# Patient Record
Sex: FEMALE
[De-identification: names, ages, dates, MRNs, and addresses within clinical notes are randomized per-mention and may not be internally consistent; named-entity substitution may affect disease eponyms.]

---

## 2019-12-30 ENCOUNTER — HOSPITAL ENCOUNTER (INPATIENT)
Dept: HOSPITAL 41 - JD.OBCHECK | Age: 24
LOS: 2 days | Discharge: HOME | End: 2020-01-01
Attending: FAMILY MEDICINE | Admitting: FAMILY MEDICINE
Payer: COMMERCIAL

## 2019-12-30 DIAGNOSIS — D64.9: ICD-10-CM

## 2019-12-30 DIAGNOSIS — F32.9: ICD-10-CM

## 2019-12-30 DIAGNOSIS — Z3A.39: ICD-10-CM

## 2019-12-30 PROCEDURE — 3E0R3BZ INTRODUCTION OF ANESTHETIC AGENT INTO SPINAL CANAL, PERCUTANEOUS APPROACH: ICD-10-PCS | Performed by: FAMILY MEDICINE

## 2019-12-30 RX ADMIN — WITCH HAZEL PRN TUBE: 500 SOLUTION RECTAL; TOPICAL at 22:19

## 2019-12-30 NOTE — PCM.LDHP
L&D History of Present Illness





- General


Date of Service: 19


Admit Problem/Dx: 


 Patient Status Order with Admit Dx/Problem





19 10:49


Patient Status [ADT] Routine 





19 14:27


Patient Status [ADT] Routine 








 Admission Diagnosis/Problem











Admission Diagnosis/Problem    Term pregnancy














Source of Information: Patient


History Limitations: Reports: No Limitations





- History of Present Illness


Introduction:: 





25 yo primip at 39 +5 weeks gestation admitted after SROM at home at 0700 

today.  She had a large gush of clear fluid and then has continued to leak 

fluid since then.  She was scheduled for induction tonight.  She did notice a 

bit of bloody discharge as well after the gush.  She had not been having 

contractions and by noon when I went over to see her, she wasn't really having 

any contractions but was starting to feel some tightening.  THe unit has been 

really busy and have not yet started an IV to start pitocin induction on her.  

NST reactive on admission.  She is GBS negative.  Prenatal course has been 

unremarkable.  Her blood type is A positive, infection testing all negative.  

Pap was normal.  1 hour glucola was 135 so we did 3 hour GTT and all results 

were negatvie.  She does have a history of depression and has continued her 

regular dose of Prozac during her pregnancy and her mood has been stable.  She 

is on 10 mg of prozac.  She plans to breastfeed.  Baby is a girl on ultrasound.

  


Pain Score: 7





- Related Data


Allergies/Adverse Reactions: 


 Allergies











Allergy/AdvReac Type Severity Reaction Status Date / Time


 


doxycycline Allergy  Anaphylactic Verified 19 10:42





   Shock  











Home Medications: 


 Home Meds





FLUoxetine [PROzac] 10 mg PO DAILY 19 [History]


Pnv #30/Iron Carb&Aspg/Fa/Om3 [OB Complete with DHA Softgel] 1 each PO DAILY  [History]


Vit B6/Me-Thfolate/Me-B12/Ala [Nufola Capsule] 1 tab PO DAILY 19 [History]











Past Medical History


OB/GYN History: Reports: Pregnancy


Psychiatric History: Reports: Depression





Social & Family History





- Family History


Family Medical History: Noncontributory





- Tobacco Use


Smoking Status *Q: Never Smoker


Second Hand Smoke Exposure: No





- Caffeine Use


Caffeine Use: Reports: None





- Recreational Drug Use


Recreational Drug Use: No





H&P Review of Systems





- Review of Systems:


Review Of Systems: See Below


General: Reports: No Symptoms


HEENT: Reports: No Symptoms


Pulmonary: Reports: No Symptoms


Cardiovascular: Reports: No Symptoms


Gastrointestinal: Reports: No Symptoms


Genitourinary: Reports: No Symptoms


Musculoskeletal: Reports: No Symptoms


Skin: Reports: No Symptoms


Psychiatric: Reports: No Symptoms


Neurological: Reports: No Symptoms


Hematologic/Lymphatic: Reports: No Symptoms


Immunologic: Reports: No Symptoms





L&D Exam





- Exam


Exam: See Below





- Vital Signs


Vital Signs: 


 Last Vital Signs











Temp  36.7 C   19 10:47


 


Pulse  92   19 10:47


 


Resp  16   19 10:47


 


BP  118/73   19 10:47


 


Pulse Ox      











Weight: 86.636 kg





- OB Specific


Contraction Duration (sec): irregular tightenings


Contraction Intensity: Mild


Fetal Movement: Active


Fetal Heart Tones: Present


Fetal Heart Tones per Min: 135


Fetal Heart Rate (FHR) Variability: Moderate (6-25 bmp)


Birth Presentation: Vertex


Estimated Fetal Weight: 8 lbs





- Kent Score


Kent Score Cervix Position: Anterior


Kent Score Consistency: Soft


Kent Score Effacement: >80%


Kent Score Dilation: 3-4 cm


Kent Score Infant's Station: -1 ,0


Kent Score Total: 11





- Exam


General: Alert, Oriented


HEENT: EOMI, Mucosa Moist & Pink, Pupils Equal


Neck: Supple, Trachea Midline


Lungs: Normal Respiratory Effort


Cardiovascular: Regular Rate, Regular Rhythm


GI/Abdominal Exam: Soft, No Distention


Rectal Exam: Deferred


Genitourinary: Normal external exam, Cervical dilitation, Cervical fluid


Back Exam: Normal Inspection, Full Range of Motion


Extremities: Normal Inspection, Normal Range of Motion, No Pedal Edema


Skin: Warm, Dry, Intact


Psychiatric: Alert, Normal Affect, Normal Mood





- Patient Data


Lab Results Last 24 hrs: 


 Laboratory Results - last 24 hr











  19 Range/Units





  11:30 11:30 


 


WBC  10.53 H   (3.98-10.04)  K/mm3


 


RBC  4.14   (3.98-5.22)  M/mm3


 


Hgb  12.6   (11.2-15.7)  gm/dl


 


Hct  37.8   (34.1-44.9)  %


 


MCV  91.3   (79.4-94.8)  fl


 


MCH  30.4   (25.6-32.2)  pg


 


MCHC  33.3   (32.2-35.5)  g/dl


 


RDW Std Deviation  46.9 H   (36.4-46.3)  fL


 


Plt Count  202   (182-369)  K/mm3


 


MPV  10.2   (9.4-12.3)  fl


 


Neut % (Auto)  77.1 H   (34.0-71.1)  %


 


Lymph % (Auto)  14.3 L   (19.3-51.7)  %


 


Mono % (Auto)  7.0   (4.7-12.5)  %


 


Eos % (Auto)  0.5 L   (0.7-5.8)  


 


Baso % (Auto)  0.2   (0.1-1.2)  %


 


Neut # (Auto)  8.12 H   (1.56-6.13)  K/mm3


 


Lymph # (Auto)  1.51   (1.18-3.74)  K/mm3


 


Mono # (Auto)  0.74 H   (0.24-0.36)  K/mm3


 


Eos # (Auto)  0.05   (0.04-0.36)  K/mm3


 


Baso # (Auto)  0.02   (0.01-0.08)  K/mm3


 


Blood Type   A POSITIVE  


 


Gel Antibody Screen   Negative  











Result Diagrams: 


 19 11:30








- Problem List


(1) SROM (spontaneous rupture of membranes)


SNOMED Code(s): 821813996


   ICD Code: ZFZ8205 -    Status: Acute   Current Visit: Yes   





(2) 39 weeks gestation of pregnancy


SNOMED Code(s): 55819526


   ICD Code: Z3A.39 - 39 WEEKS GESTATION OF PREGNANCY   Status: Acute   Current 

Visit: Yes   


Problem List Initiated/Reviewed/Updated: Yes


Orders Last 24hrs: 


 Active Orders 24 hr











 Category Date Time Status


 


 Patient Status [ADT] Routine ADT  19 14:27 Active


 


 Activity as Tolerated [RC] PFP Care  19 10:47 Active


 


 Communication Order [RC] ASDIRECTED Care  19 10:47 Active


 


 Notify Provider [RC] ASDIRECTED Care  19 11:50 Active


 


 Notify Provider [RC] PFP Care  19 10:47 Active


 


 Notify Provider [RC] PRN Care  19 10:47 Active


 


 Peripheral IV Care [RC] Q2HR Care  19 10:48 Active


 


 Vital Signs [RC] PER UNIT ROUTINE Care  19 10:47 Active


 


 Regular Diet [DIET] Diet  19 Lunch Active


 


 RAPID PLASMA REAGIN,RPR [CHEM] Routine Lab  19 11:30 Received


 


 Bupivicaine/fentaNYL/NS [fentaNYL/Bupivacaine/NS 2 MCG- Med  19 11:50 

Active





 0.125% 250 ML]   





 250 ml EPIDUR CONTINUOUS PRN   


 


 Lactated Ringers [Ringers, Lactated] 1,000 ml Med  19 11:00 Active





 IV ASDIRECTED   


 


 Nalbuphine [Nubain] Med  19 10:47 Active





 10 mg IVPUSH Q2H PRN   


 


 Ondansetron [Zofran] Med  19 15:44 Active





 4 mg IVPUSH Q4H PRN   


 


 Oxytocin/Lactated Ringers [Pitocin in LR 10 Units/1,000 Med  19 11:00 

Active





 ML]   





 10 unit in 1,000 ml IV .CONTINUOUS   


 


 Oxytocin/Lactated Ringers [Pitocin in LR 10 Units/1,000 Med  19 11:00 

Active





 ML]   





 10 unit in 1,000 ml IV TITRATE   


 


 Sodium Chloride 0.9% [Saline Flush] Med  19 10:47 Active





 10 ml FLUSH ASDIRECTED PRN   


 


 diphenhydrAMINE [Benadryl] Med  19 11:50 Active





 25 mg IVPUSH Q6H PRN   


 


 ePHEDrine [ePHEDrine sulfate] Med  19 11:50 Active





 5 mg IVPUSH ASDIRECTED PRN   


 


 fentaNYL [Sublimaze] Med  19 11:50 Active





 100 mcg EPIDUR Q3H PRN   


 


 Electronic Fetal Heart Tones Ext w TOCO [WOMSER] Oth  19 10:47 Ordered





 Routine   


 


 Electronic Fetal Heart Tones Internal [WOMSER] Per Unit Oth  19 10:47 

Ordered





 Routine   


 


 Peripheral IV Insertion Adult [OM.PC] Routine Oth  19 10:47 Ordered


 


 Resuscitation Status Routine Resus Stat  19 10:47 Ordered








 Medication Orders





Diphenhydramine HCl (Benadryl)  25 mg IVPUSH Q6H PRN


   PRN Reason: pruritis


Ephedrine Sulfate (Ephedrine Sulfate)  5 mg IVPUSH ASDIRECTED PRN


   PRN Reason: Hypotension


Fentanyl (Sublimaze)  100 mcg EPIDUR Q3H PRN


   PRN Reason: Pain


   Last Admin: 19 15:29  Dose: 100 mcg


Fentanyl/Bupivacaine HCl (Fentanyl/Bupivacaine/Ns 2 Mcg-0.125% 250 Ml)  250 ml 

EPIDUR CONTINUOUS PRN


   PRN Reason: Pain


   Last Admin: 19 15:30  Dose: 250 ml


Lactated Ringer's (Ringers, Lactated)  1,000 mls @ 100 mls/hr IV ASDIRECTED FABBY


   Last Admin: 19 17:04  Dose: 100 mls/hr


   Infusion: 19 17:04  Dose: 999 mls/hr


   Admin: 19 16:03  Dose: 999 mls/hr


   Infusion: 19 16:03  Dose: 100 mls/hr


   Admin: 19 13:11  Dose: 100 mls/hr


Oxytocin/Lactated Ringer's (Pitocin In Lr 10 Units/1,000 Ml)  10 unit in 1,000 

mls @ 12 mls/hr IV TITRATE FABBY; Protocol


   Last Titration: 19 17:00  Dose: 14 munits/min, 84 mls/hr


   Titration: 19 16:30  Dose: 12 munits/min, 72 mls/hr


   Titration: 19 15:56  Dose: 10 munits/min, 60 mls/hr


   Titration: 19 14:59  Dose: 8 munits/min, 48 mls/hr


   Titration: 19 14:30  Dose: 6 munits/min, 36 mls/hr


   Titration: 19 13:58  Dose: 4 munits/min, 24 mls/hr


   Admin: 19 13:12  Dose: 2 munits/min, 12 mls/hr


Oxytocin/Lactated Ringer's (Pitocin In Lr 10 Units/1,000 Ml)  10 unit in 1,000 

mls @ 100 mls/hr IV .CONTINUOUS FABBY; Protocol


Nalbuphine HCl (Nubain)  10 mg IVPUSH Q2H PRN


   PRN Reason: Pain


Ondansetron HCl (Zofran)  4 mg IVPUSH Q4H PRN


   PRN Reason: Nausea


   Last Admin: 19 15:40  Dose: 4 mg


Sodium Chloride (Saline Flush)  10 ml FLUSH ASDIRECTED PRN


   PRN Reason: Keep Vein Open








Assessment/Plan Comment:: 





24 year old  at 39+5 weeks gestation with SROM and favorable cervix.  Not 

having contractions, so will start pitocin to augment.  SROM was at 0700 and at 

12:30 pm, still waiting for nursing staff to be able to start the pitocin.  

Reactive NST.





Plan:  Start pitocin augmentation


         Expectant management of labor, plan for vaginal delivery.


         Epidural when appropriate.


         Plans to breastfeed.

## 2019-12-30 NOTE — PCM.DEL
L & D Note





- General Info


Date of Service: 19


Mother's Due Date: 20





- Delivery Note


Labor: Spontaneous, Augmented by Oxytocin


Delivery Outcome: Livebirth


Infant Delivery Method: Spontaneous Vaginal Delivery-Single


Infant Delivery Mode: Spontaneous


Birth Presentation: Right Occiput Anterior (BELKYS)


Nuchal Cord: None


Prep: Povidone-Iodine (Betadine


Anesthesia Type: Epidural


Amniotic Fluid Description: Clear


Episiotomy Type: Right Mediolateral


Laceration: Periurethral


Suture type: Vicryl


Suture size: 3-0


Placenta: Intact, Spontaneous


Cord: 3 Vessels


Estimated Blood Loss: 600


Resuscitation Needed: No


[place]: Suctioned (DeLee suction for 21 ml of clear fluid), Stimulated, Warmed

, [place] Used, Warmer Used


 Provider: Gilma Liu


APGAR Score 1 min: 8


APGAR Score 5 min: 9


Delivery Comments (Free Text/Narrative):: 





25 yo A1 at 39 +5 weeks gestation admitted after SROM at home at 0700 

today.  She had a large gush of clear fluid and then has continued to leak 

fluid since then.  She was scheduled for induction tonight.  She did notice a 

bit of bloody discharge as well after the gush.  She had not been having 

contractions and by noon when I went over to see her, she wasn't really having 

any contractions but was starting to feel some tightening.  THe unit had been 

really busy and Pitocin induction was started at 1310.  NST reactive on 

admission.  She is GBS negative.  Prenatal course has been unremarkable.  Her 

blood type is A positive, infection testing all negative.  Pap was normal.  1 

hour glucola was 135 so we did 3 hour GTT and all results were negatvie.  She 

does have a history of depression and has continued her regular dose of Prozac 

during her pregnancy and her mood has been stable.  She is on 10 mg of prozac.  

She plans to breastfeed.  Baby is a girl on ultrasound. 





She did start having contractions with the pitocin and was increased to a max 

of 14 mU/min.  She requested an epidural which was placed at about 1545.  She 

had good relief with the epidural.  She was 6 cm dilated by 1630 and then 

completely dilated at 1830.  We had her start pushing and she did well.  Baby 

tolerated second stage of labor.  FHR between pushes was  and then up to 

130-150 during pushes.  The perineum was tight around baby's head.  Lidocaine 

was locally infiltrated into the perineum and then a RML episiotomy was 

performed with a push and baby's head delivered with that set of pushes.  Baby 

was BELKYS.  THere was no nuchal cord.  THe shoulders and the rest of the baby 

delivered without difficulty. Time of delivery was  and it is a baby girl. 

  Mouth and nose were suctioned and then she was placed on mother's abdomen for 

further drying and stimulation.  Once the cord stopped pulsating, it was 

clamped and cut.  Baby was placed skin to skin.  Placenta delivered 

spontaneously at  and was intact, 3 vessels in the cord.  Pitocin IV bolus 

was started and bimanual massage was performed.  There was brisk flow of blood 

from the episiotomy site.  There was no extension to the episiotomy.  3-0 

vicryl was used to repair in the usual manner.  I had to place several deep 

sutures prior to completing the episiotomy repair.  EBL was 600 ml.  BP 

remained stable and patient was left in the delivery room with baby in stable 

condition.  Fundus was firm after repair complete and some clots were expressed 

from the uterus.  





- General Info


Date of Service: 19


Admission Dx/Problem (Free Text): 


 Patient Status Order with Admit Dx/Problem





19 10:49


Patient Status [ADT] Routine 





19 14:27


Patient Status [ADT] Routine 








 Admission Diagnosis/Problem











Admission Diagnosis/Problem    Term pregnancy














Functional Status: Reports: Pain Controlled, Tolerating Diet





- Review of Systems


General: Reports: No Symptoms


HEENT: Reports: No Symptoms


Pulmonary: Reports: No Symptoms


Cardiovascular: Reports: No Symptoms


Gastrointestinal: Reports: No Symptoms


Genitourinary: Reports: No Symptoms


Musculoskeletal: Reports: No Symptoms


Skin: Reports: No Symptoms


Neurological: Reports: No Symptoms


Psychiatric: Reports: No Symptoms





- Patient Data


Vitals - Most Recent: 


 Last Vital Signs











Temp  36.7 C   19 10:47


 


Pulse  92   19 10:47


 


Resp  16   19 10:47


 


BP  118/73   19 10:47


 


Pulse Ox      











Weight - Most Recent: 86.636 kg


Lab Results Last 24 Hours: 


 Laboratory Results - last 24 hr











  19 Range/Units





  11:30 11:30 11:30 


 


WBC   10.53 H   (3.98-10.04)  K/mm3


 


RBC   4.14   (3.98-5.22)  M/mm3


 


Hgb   12.6   (11.2-15.7)  gm/dl


 


Hct   37.8   (34.1-44.9)  %


 


MCV   91.3   (79.4-94.8)  fl


 


MCH   30.4   (25.6-32.2)  pg


 


MCHC   33.3   (32.2-35.5)  g/dl


 


RDW Std Deviation   46.9 H   (36.4-46.3)  fL


 


Plt Count   202   (182-369)  K/mm3


 


MPV   10.2   (9.4-12.3)  fl


 


Neut % (Auto)   77.1 H   (34.0-71.1)  %


 


Lymph % (Auto)   14.3 L   (19.3-51.7)  %


 


Mono % (Auto)   7.0   (4.7-12.5)  %


 


Eos % (Auto)   0.5 L   (0.7-5.8)  


 


Baso % (Auto)   0.2   (0.1-1.2)  %


 


Neut # (Auto)   8.12 H   (1.56-6.13)  K/mm3


 


Lymph # (Auto)   1.51   (1.18-3.74)  K/mm3


 


Mono # (Auto)   0.74 H   (0.24-0.36)  K/mm3


 


Eos # (Auto)   0.05   (0.04-0.36)  K/mm3


 


Baso # (Auto)   0.02   (0.01-0.08)  K/mm3


 


RPR  Non-reactive    (NONREACTIVE)  


 


Blood Type    A POSITIVE  


 


Gel Antibody Screen    Negative  











Med Orders - Current: 


 Current Medications





Diphenhydramine HCl (Benadryl)  25 mg IVPUSH Q6H PRN


   PRN Reason: pruritis


Ephedrine Sulfate (Ephedrine Sulfate)  5 mg IVPUSH ASDIRECTED PRN


   PRN Reason: Hypotension


Fentanyl (Sublimaze)  100 mcg EPIDUR Q3H PRN


   PRN Reason: Pain


   Last Admin: 19 15:29 Dose:  100 mcg


Fentanyl/Bupivacaine HCl (Fentanyl/Bupivacaine/Ns 2 Mcg-0.125% 250 Ml)  250 ml 

EPIDUR CONTINUOUS PRN


   PRN Reason: Pain


   Last Admin: 19 15:30 Dose:  250 ml


Lactated Ringer's (Ringers, Lactated)  1,000 mls @ 100 mls/hr IV ASDIRECTED FABBY


   Last Admin: 19 17:04 Dose:  100 mls/hr


Oxytocin/Lactated Ringer's (Pitocin In Lr 10 Units/1,000 Ml)  10 unit in 1,000 

mls @ 12 mls/hr IV TITRATE FABBY; Protocol


   Last Titration: 19 17:00 Dose:  14 munits/min, 84 mls/hr


Oxytocin/Lactated Ringer's (Pitocin In Lr 10 Units/1,000 Ml)  10 unit in 1,000 

mls @ 100 mls/hr IV .CONTINUOUS FABBY; Protocol


Nalbuphine HCl (Nubain)  10 mg IVPUSH Q2H PRN


   PRN Reason: Pain


Ondansetron HCl (Zofran)  4 mg IVPUSH Q4H PRN


   PRN Reason: Nausea


   Last Admin: 19 15:40 Dose:  4 mg


Sodium Chloride (Saline Flush)  10 ml FLUSH ASDIRECTED PRN


   PRN Reason: Keep Vein Open





Discontinued Medications





Lidocaine HCl (Xylocaine 1%) Confirm Administered Dose 50 ml .ROUTE .[facility]


   Stop: 19 20:12


Nalbuphine HCl (Nubain)  10 mg IVPUSH Q2H PRN


   PRN Reason: Pain











- Exam


General: Alert, Oriented


HEENT: Pupils Equal, Mucous Membr. Moist/Pink


Neck: Supple


Lungs: Normal Respiratory Effort


Cardiovascular: Regular Rate, Regular Rhythm


GI/Abdominal Exam: Normal Bowel Sounds, Soft


 (Female) Exam: Fundal Height (firm at umbilicus), Vaginal Bleeding


Back Exam: Normal Inspection, Full Range of Motion


Extremities: Normal Inspection, Normal Range of Motion


Neurological: No New Focal Deficit


Psy/Mental Status: Alert, Normal Affect, Normal Mood





- Problem List & Annotations


(1) SROM (spontaneous rupture of membranes)


SNOMED Code(s): 742978737


   Code(s): FIH2535 -    Status: Acute   Current Visit: Yes   





(2) 39 weeks gestation of pregnancy


SNOMED Code(s): 40097302


   Code(s): Z3A.39 - 39 WEEKS GESTATION OF PREGNANCY   Status: Acute   Current 

Visit: Yes   





(3) Normal spontaneous vaginal delivery


SNOMED Code(s): 31838761, 171257691


   Code(s): O80 - ENCOUNTER FOR FULL-TERM UNCOMPLICATED DELIVERY   Status: 

Acute   Current Visit: Yes   





(4) Mother currently breast-feeding


SNOMED Code(s): 526871482


   Code(s): Z39.1 - ENCOUNTER FOR CARE AND EXAMINATION OF LACTATING MOTHER   

Status: Acute   Current Visit: Yes   





(5) Depression affecting pregnancy


SNOMED Code(s): 52823098823762


   Code(s): O99.340 - [facility] MENTAL DISORDERS COMPLICATING PREGNANCY, UNSP 

TRIMESTER; F32.9 - MAJOR DEPRESSIVE DISORDER, SINGLE EPISODE, UNSPECIFIED   

Status: Acute   Current Visit: Yes   





- Problem List Review


Problem List Initiated/Reviewed/Updated: Yes





- My Orders


Last 24 Hours: 


My Active Orders





19 10:47


Activity as Tolerated [RC] PFP 


Communication Order [RC] ASDIRECTED 


Notify Provider [RC] PFP 


Notify Provider [RC] PRN 


Vital Signs [RC] PER UNIT ROUTINE 


Nalbuphine [Nubain]   10 mg IVPUSH Q2H PRN 


Sodium Chloride 0.9% [Saline Flush]   10 ml FLUSH ASDIRECTED PRN 


Electronic Fetal Heart Tones Ext w TOCO [WOMSER] Routine 


Electronic Fetal Heart Tones Internal [WOMSER] Per Unit Routine 


Peripheral IV Insertion Adult [OM.PC] Routine 


Resuscitation Status Routine 





19 10:48


Peripheral IV Care [RC] Q2HR 





19 11:00


Lactated Ringers [Ringers, Lactated] 1,000 ml IV ASDIRECTED 


Oxytocin/Lactated Ringers [Pitocin in LR 10 Units/1,000 ML] 10 unit in 1,000 ml 

IV .CONTINUOUS 


Oxytocin/Lactated Ringers [Pitocin in LR 10 Units/1,000 ML] 10 unit in 1,000 ml 

IV TITRATE 





19 14:27


Patient Status [ADT] Routine 





19 15:44


Ondansetron [Zofran]   4 mg IVPUSH Q4H PRN 





19 Lunch


Regular Diet [DIET] 














- Assessment


Assessment:: 





 of a LGA infant weighing 9 lb 2 oz, 4150 grams.  RML episiotomy performed 

and deep sutures required for repair.  Mom plans to breastfeed.  





- Plan


Plan:: 





24 year old  at 39+5 weeks gestation with SROM and favorable cervix.  Not 

having contractions, so will start pitocin to augment.  SROM was at 0700 and at 

12:30 pm, still waiting for nursing staff to be able to start the pitocin.  

Reactive NST.





Plan:  Start pitocin augmentation


         Expectant management of labor, plan for vaginal delivery.


         Epidural when appropriate.


         Plans to breastfeed.  





190:  Routine postpartum care.   ml, monitor for further 

bleeding and with episiotomy, watch for perineal hematoma.  


                       Breastfeeding support and education.

## 2019-12-30 NOTE — PCM.PREANE
Preanesthetic Assessment





- Anesthesia/Transfusion/Family Hx


Anesthesia History: Prior Anesthesia Without Reaction


Family History of Anesthesia Reaction: No


Transfusion History: No Prior Transfusion(s)





- Review of Systems


General: No Symptoms


Pulmonary: No Symptoms


Cardiovascular: No Symptoms


Gastrointestinal: No Symptoms


Neurological: Other (Occasional back pain with nerve pain relieved with 

stretching legs. )


Other: Reports: None





- Physical Assessment


Vital Signs: 





 Last Vital Signs











Temp  36.7 C   12/30/19 10:47


 


Pulse  92   12/30/19 10:47


 


Resp  16   12/30/19 10:47


 


BP  118/73   12/30/19 10:47


 


Pulse Ox      











Height: 1.63 m


Weight: 86.636 kg


ASA Class: 2


Mental Status: Alert & Oriented x3


Airway Class: Mallampati = 2


Dentition: Reports: Normal Dentition


Thyro-Mental Finger Breadths: 3


Mouth Opening Finger Breadths: 3


ROM/Head Extension: Full


Lungs: Clear to Auscultation, Normal Respiratory Effort


Cardiovascular: Regular Rate, Regular Rhythm





- Lab


Values: 





 Laboratory Last Values











WBC  10.53 K/mm3 (3.98-10.04)  H  12/30/19  11:30    


 


RBC  4.14 M/mm3 (3.98-5.22)   12/30/19  11:30    


 


Hgb  12.6 gm/dl (11.2-15.7)   12/30/19  11:30    


 


Hct  37.8 % (34.1-44.9)   12/30/19  11:30    


 


MCV  91.3 fl (79.4-94.8)   12/30/19  11:30    


 


MCH  30.4 pg (25.6-32.2)   12/30/19  11:30    


 


MCHC  33.3 g/dl (32.2-35.5)   12/30/19  11:30    


 


RDW Std Deviation  46.9 fL (36.4-46.3)  H  12/30/19  11:30    


 


Plt Count  202 K/mm3 (182-369)   12/30/19  11:30    


 


MPV  10.2 fl (9.4-12.3)   12/30/19  11:30    


 


Neut % (Auto)  77.1 % (34.0-71.1)  H  12/30/19  11:30    


 


Lymph % (Auto)  14.3 % (19.3-51.7)  L  12/30/19  11:30    


 


Mono % (Auto)  7.0 % (4.7-12.5)   12/30/19  11:30    


 


Eos % (Auto)  0.5  (0.7-5.8)  L  12/30/19  11:30    


 


Baso % (Auto)  0.2 % (0.1-1.2)   12/30/19  11:30    


 


Neut # (Auto)  8.12 K/mm3 (1.56-6.13)  H  12/30/19  11:30    


 


Lymph # (Auto)  1.51 K/mm3 (1.18-3.74)   12/30/19  11:30    


 


Mono # (Auto)  0.74 K/mm3 (0.24-0.36)  H  12/30/19  11:30    


 


Eos # (Auto)  0.05 K/mm3 (0.04-0.36)   12/30/19  11:30    


 


Baso # (Auto)  0.02 K/mm3 (0.01-0.08)   12/30/19  11:30    


 


Blood Type  A POSITIVE   12/30/19  11:30    


 


Gel Antibody Screen  Negative   12/30/19  11:30    














- Allergies


Allergies/Adverse Reactions: 


 Allergies











Allergy/AdvReac Type Severity Reaction Status Date / Time


 


doxycycline Allergy  Anaphylactic Verified 12/30/19 10:42





   Shock  














- Acknowledgements


Anesthesia Type Planned: Epidural


Pt an Appropriate Candidate for the Planned Anesthesia: Yes


Alternatives and Risks of Anesthesia Discussed w Pt/Guardian: Yes


Pt/Guardian Understands and Agrees with Anesthesia Plan: Yes





PreAnesthesia Questionnaire


OB/GYN History: Reports: Pregnancy


Psychiatric History: Reports: Depression





- SUBSTANCE USE


Smoking Status *Q: Never Smoker


Second Hand Smoke Exposure: No


Recreational Drug Use History: No





- HOME MEDS


Home Medications: 


 Home Meds





FLUoxetine [PROzac] 10 mg PO DAILY 12/30/19 [History]


Pnv #30/Iron Carb&Aspg/Fa/Om3 [OB Complete with DHA Softgel] 1 each PO DAILY 12/ 30/19 [History]


Vit B6/Me-Thfolate/Me-B12/Ala [Nufola Capsule] 1 tab PO DAILY 12/30/19 [History]











- CURRENT (IN HOUSE) MEDS


Current Meds: 





 Current Medications





Diphenhydramine HCl (Benadryl)  25 mg IVPUSH Q6H PRN


   PRN Reason: pruritis


Ephedrine Sulfate (Ephedrine Sulfate)  5 mg IVPUSH ASDIRECTED PRN


   PRN Reason: Hypotension


Fentanyl (Sublimaze)  100 mcg EPIDUR Q3H PRN


   PRN Reason: Pain


Fentanyl/Bupivacaine HCl (Fentanyl/Bupivacaine/Ns 2 Mcg-0.125% 250 Ml)  250 ml 

EPIDUR CONTINUOUS PRN


   PRN Reason: Pain


Lactated Ringer's (Ringers, Lactated)  1,000 mls @ 100 mls/hr IV ASDIRECTED FABBY


   Last Admin: 12/30/19 13:11 Dose:  100 mls/hr


Oxytocin/Lactated Ringer's (Pitocin In Lr 10 Units/1,000 Ml)  10 unit in 1,000 

mls @ 12 mls/hr IV TITRATE FABBY; Protocol


   Last Titration: 12/30/19 14:30 Dose:  6 munits/min, 36 mls/hr


Oxytocin/Lactated Ringer's (Pitocin In Lr 10 Units/1,000 Ml)  10 unit in 1,000 

mls @ 100 mls/hr IV .CONTINUOUS FABBY; Protocol


Nalbuphine HCl (Nubain)  10 mg IVPUSH Q2H PRN


   PRN Reason: Pain


Sodium Chloride (Saline Flush)  10 ml FLUSH ASDIRECTED PRN


   PRN Reason: Keep Vein Open





Discontinued Medications





Nalbuphine HCl (Nubain)  10 mg IVPUSH Q2H PRN


   PRN Reason: Pain

## 2019-12-31 RX ADMIN — VITAMIN A, ASCORBIC ACID, CHOLECALCIFEROL, .ALPHA.-TOCOPHEROL ACETATE, DL-, THIAMINE MONONITRATE, RIBOFLAVIN, NIACINAMIDE, PYRIDOXINE HYDROCHLORIDE, FOLIC ACID, CYANOCOBALAMIN, CALCIUM CARBONATE, IRON, ZINC OXIDE, AND CUPRIC OXIDE SCH EACH: 4000; 120; 400; 22; 1.84; 3; 20; 10; 1; 12; 200; 29; 25; 2 TABLET ORAL at 08:40

## 2019-12-31 RX ADMIN — WITCH HAZEL PRN TUBE: 500 SOLUTION RECTAL; TOPICAL at 22:27

## 2019-12-31 RX ADMIN — FERROUS SULFATE TAB EC 324 MG (65 MG FE EQUIVALENT) SCH MG: 324 (65 FE) TABLET DELAYED RESPONSE at 08:40

## 2019-12-31 NOTE — [id number]
Post Anesthesia Note





- EVALUATION WITHIN 48HRS OF ANESTHETIC


Vital Signs in Normal Range: Yes


Patient Participated in Evaluation: Yes


Respiratory Function Stable: Yes


Airway Patent: Yes


Cardiovascular Function Stable: Yes


Hydration Status Stable: Yes


Pain Control Satisfactory: Yes


Nausea and Vomiting Control Satisfactory: Yes


Mental Status Recovered: Yes


Vital Signs: 


 Last Vital Signs











Temp  36.7 C   12/31/19 03:10


 


Pulse  92   12/31/19 03:10


 


Resp  14   12/31/19 03:10


 


BP  103/70   12/31/19 03:10


 


Pulse Ox  99   12/31/19 03:10

## 2019-12-31 NOTE — PCM.PNPP
- General Info


Date of Service: 19


Admission Dx/Problem (Free Text): 


 Patient Status Order with Admit Dx/Problem





19 10:49


Patient Status [ADT] Routine 





19 14:27


Patient Status [ADT] Routine 








 Admission Diagnosis/Problem











Admission Diagnosis/Problem    Term pregnancy














Functional Status: Reports: Pain Controlled (Has only used ibuprofen for 

analgesia), Tolerating Diet, Ambulating, Urinating





- Review of Systems


General: Reports: No Symptoms


HEENT: Reports: No Symptoms


Pulmonary: Reports: No Symptoms


Cardiovascular: Reports: Edema


Gastrointestinal: Reports: No Symptoms


Genitourinary: Reports: No Symptoms


Musculoskeletal: Reports: Back Pain (Low back uncomfortable.  Mild tenderness 

over epidural site)


Skin: Reports: No Symptoms


Neurological: Reports: No Symptoms


Psychiatric: Reports: No Symptoms





- General Info


Date of Service: 19





- Patient Data


Vital Signs - Most Recent: 


 Last Vital Signs











Temp  36.7 C   19 03:10


 


Pulse  92   19 03:10


 


Resp  14   19 03:10


 


BP  103/70   19 03:10


 


Pulse Ox  99   19 03:10











Weight - Most Recent: 86.636 kg


Lab Results - Last 24 Hours: 


 Laboratory Results - last 24 hr











  19 Range/Units





  11:30 11:30 11:30 


 


WBC   10.53 H   (3.98-10.04)  K/mm3


 


RBC   4.14   (3.98-5.22)  M/mm3


 


Hgb   12.6   (11.2-15.7)  gm/dl


 


Hct   37.8   (34.1-44.9)  %


 


MCV   91.3   (79.4-94.8)  fl


 


MCH   30.4   (25.6-32.2)  pg


 


MCHC   33.3   (32.2-35.5)  g/dl


 


RDW Std Deviation   46.9 H   (36.4-46.3)  fL


 


Plt Count   202   (182-369)  K/mm3


 


MPV   10.2   (9.4-12.3)  fl


 


Neut % (Auto)   77.1 H   (34.0-71.1)  %


 


Lymph % (Auto)   14.3 L   (19.3-51.7)  %


 


Mono % (Auto)   7.0   (4.7-12.5)  %


 


Eos % (Auto)   0.5 L   (0.7-5.8)  


 


Baso % (Auto)   0.2   (0.1-1.2)  %


 


Neut # (Auto)   8.12 H   (1.56-6.13)  K/mm3


 


Lymph # (Auto)   1.51   (1.18-3.74)  K/mm3


 


Mono # (Auto)   0.74 H   (0.24-0.36)  K/mm3


 


Eos # (Auto)   0.05   (0.04-0.36)  K/mm3


 


Baso # (Auto)   0.02   (0.01-0.08)  K/mm3


 


RPR  Non-reactive    (NONREACTIVE)  


 


Blood Type    A POSITIVE  


 


Gel Antibody Screen    Negative  














  19 Range/Units





  06:20 


 


WBC  14.96 H  (3.98-10.04)  K/mm3


 


RBC  3.12 L  (3.98-5.22)  M/mm3


 


Hgb  9.3 L D  (11.2-15.7)  gm/dl


 


Hct  28.6 L  (34.1-44.9)  %


 


MCV  91.7  (79.4-94.8)  fl


 


MCH  29.8  (25.6-32.2)  pg


 


MCHC  32.5  (32.2-35.5)  g/dl


 


RDW Std Deviation  45.6  (36.4-46.3)  fL


 


Plt Count  206  (182-369)  K/mm3


 


MPV  10.6  (9.4-12.3)  fl


 


Neut % (Auto)   (34.0-71.1)  %


 


Lymph % (Auto)   (19.3-51.7)  %


 


Mono % (Auto)   (4.7-12.5)  %


 


Eos % (Auto)   (0.7-5.8)  


 


Baso % (Auto)   (0.1-1.2)  %


 


Neut # (Auto)   (1.56-6.13)  K/mm3


 


Lymph # (Auto)   (1.18-3.74)  K/mm3


 


Mono # (Auto)   (0.24-0.36)  K/mm3


 


Eos # (Auto)   (0.04-0.36)  K/mm3


 


Baso # (Auto)   (0.01-0.08)  K/mm3


 


RPR   (NONREACTIVE)  


 


Blood Type   


 


Gel Antibody Screen   











Med Orders - Current: 


 Current Medications





Acetaminophen (Tylenol)  650 mg PO Q4H PRN


   PRN Reason: mild pain or fever


Benzocaine/Menthol (Dermoplast Pain Relief Spray)  0 gm TOP ASDIRECTED PRN


   PRN Reason: Perineal Comfort Measure


   Last Admin: 19 22:19 Dose:  1 canister


Docusate Sodium (Colace)  100 mg PO BID FABBY


   Last Admin: 19 22:21 Dose:  100 mg


Ferrous Sulfate (Ferrous Sulfate)  324 mg PO WITHBREAKFAST FABBY


Fluoxetine HCl (Prozac)  10 mg PO DAILY [facility]


Hydrocortisone Acetate (Anucort-Hc)  25 mg RECTAL BID PRN


   PRN Reason: Hemorrhoid pain


Ibuprofen (Motrin)  800 mg PO Q6H PRN


   PRN Reason: Mild pain or fever


   Last Admin: 19 22:20 Dose:  800 mg


Prenat Multivit/[place]/Iron/Folic Ac (Prenatal Plus Iron)  1 each PO DAILY [facility]


Simethicone (Simethicone)  80 mg PO Q4H PRN


   PRN Reason: Gas


Witch Hazel (Tucks)  1 pad TOP ASDIRECTED PRN


   PRN Reason: Perineal Comfort Measure


   Last Admin: 19 22:19 Dose:  1 tube





Discontinued Medications





Diphenhydramine HCl (Benadryl)  25 mg IVPUSH Q6H PRN


   PRN Reason: pruritis


Ephedrine Sulfate (Ephedrine Sulfate)  5 mg IVPUSH ASDIRECTED PRN


   PRN Reason: Hypotension


Fentanyl (Sublimaze)  100 mcg EPIDUR Q3H PRN


   PRN Reason: Pain


   Last Admin: 19 15:29 Dose:  100 mcg


Fentanyl/Bupivacaine HCl (Fentanyl/Bupivacaine/Ns 2 Mcg-0.125% 250 Ml)  250 ml 

EPIDUR CONTINUOUS PRN


   PRN Reason: Pain


   Last Admin: 19 15:30 Dose:  250 ml


Lactated Ringer's (Ringers, Lactated)  1,000 mls @ 100 mls/hr IV ASDIRECTED FABBY


   Last Admin: 19 17:04 Dose:  100 mls/hr


Oxytocin/Lactated Ringer's (Pitocin In Lr 10 Units/1,000 Ml)  10 unit in 1,000 

mls @ 12 mls/hr IV TITRATE FABBY; Protocol


   Last Titration: 19 20:16 Dose:  500 mls/hr


Oxytocin/Lactated Ringer's (Pitocin In Lr 10 Units/1,000 Ml)  10 unit in 1,000 

mls @ 100 mls/hr IV .CONTINUOUS FABBY; Protocol


   Last Admin: 19 21:35 Dose:  250 mls/hr


Lidocaine HCl (Xylocaine 1%) Confirm Administered Dose 50 ml .ROUTE .ST[place]-MED ONE


   Stop: 19 20:12


   Last Admin: 19 20:10 Dose:  50 ml


Nalbuphine HCl (Nubain)  10 mg IVPUSH Q2H PRN


   PRN Reason: Pain


Nalbuphine HCl (Nubain)  10 mg IVPUSH Q2H PRN


   PRN Reason: Pain


Ondansetron HCl (Zofran)  4 mg IVPUSH Q4H PRN


   PRN Reason: Nausea


   Last Admin: 19 15:40 Dose:  4 mg


Sodium Chloride (Saline Flush)  10 ml FLUSH ASDIRECTED PRN


   PRN Reason: Keep Vein Open











- Infant Interaction


Infant Disposition, Postpartum:  at Bedside


Infant Interaction: Holding Infant


Infant Feeding: Attempted Breastfeeding; Nursed Fair/Poor (Doing better with 

latch and sucking with audible swallow this am.  )


Other Infant Feeding: expressed colostrum and formula syringe fed due to sugar 

39


Support Person: 





- Postpartum Recovery Exam


Fundal Tone: Firm


Fundal Level: 2 Fingerbreadths Below Umbilicus


Fundal Placement: Midline


Lochia Amount: Small


Lochia Color: Rubra/Red


Perineum Description: Ecchymotic, Edematous


Episiotomy/Laceration: Approximated


Bladder Status: Voiding





- Exam


General: Alert, Oriented, No Acute Distress


HEENT: Mucous Membr. Moist/Pink


Neck: Supple


Lungs: Normal Respiratory Effort


Cardiovascular: Regular Rate, Regular Rhythm


GI/Abdominal Exam: Normal Bowel Sounds, Soft, No Distention


Extremities: Pedal Edema (in feet mostly)


Skin: Warm, Dry, Intact


Wound/Incisions: Healing Well


Neurological: No New Focal Deficit


Psy/Mental Status: Alert, Normal Affect, Normal Mood





- Problem List & Annotations


(1) SROM (spontaneous rupture of membranes)


SNOMED Code(s): 045813507


   Code(s): MCI2773 -    Status: Acute   Current Visit: Yes   





(2) 39 weeks gestation of pregnancy


SNOMED Code(s): 03813085


   Code(s): Z3A.39 - 39 WEEKS GESTATION OF PREGNANCY   Status: Acute   Current 

Visit: Yes   





(3) Normal spontaneous vaginal delivery


SNOMED Code(s): 20125667, 762518033


   Code(s): O80 - ENCOUNTER FOR FULL-TERM UNCOMPLICATED DELIVERY   Status: 

Acute   Current Visit: Yes   





(4) Mother currently breast-feeding


SNOMED Code(s): 019416671


   Code(s): Z39.1 - ENCOUNTER FOR CARE AND EXAMINATION OF LACTATING MOTHER   

Status: Acute   Current Visit: Yes   





(5) Depression affecting pregnancy


SNOMED Code(s): 29586536521855


   Code(s): O99.340 - OTH MENTAL DISORDERS COMPLICATING PREGNANCY, UNSP 

TRIMESTER; F32.9 - MAJOR DEPRESSIVE DISORDER, SINGLE EPISODE, UNSPECIFIED   

Status: Acute   Current Visit: Yes   





- Problem List Review


Problem List Initiated/Reviewed/Updated: Yes





- My Orders


Last 24 Hours: 


My Active Orders





19 10:47


Resuscitation Status Routine 





19 22:02


Patient Status [ADT] Routine 


Activity as Tolerated [RC] PER UNIT ROUTINE 


May Shower [RC] ASDIRECTED 


Up ad [place] [RC] ASDIRECTED 


Vital Signs [RC] 21,03,09,15 


Acetaminophen [Tylenol]   650 mg PO Q4H PRN 


Benzocaine/Menthol [Dermoplast Pain Relief Spray]   See Dose Instructions  TOP 

ASDIRECTED PRN 


Docusate Sodium [Colace]   100 mg PO BID 


Hydrocortisone Acetate [Anucort-HC]   25 mg RECTAL BID PRN 


Ibuprofen [Motrin]   800 mg PO Q6H PRN 


Simethicone   80 mg PO Q4H PRN 


Witch Hazel [Tucks]   1 pad TOP ASDIRECTED PRN 


Assess Lochia [WOMSER] Per Unit Routine 


Assess Uterine Involution [WOMSER] Per Unit Routine 


Breast Pump [WOMSER] Per Unit Routine 


Heat Therapy [OM.PC] PRN 


Perineal Care [OM.PC] Per Unit Routine 


Peripheral IV Discontinue [OM.PC] Routine 


Sitz Bath [OM.PC] Per Unit Routine 





19 Lunch


Regular Diet [DIET] 





19 07:00


Ferrous Sulfate   324 mg PO WITHBREAKFAST 





19 09:00


FLUoxetine [PROzac]   10 mg PO DAILY 


Prenatal Vit with Ca/FA/Iron [Prenatal Plus Iron]   1 each PO DAILY 





19 22:02


Heat Therapy [OM.PC] PRN 














- Assessment


Assessment:: 





 of a LGA infant weighing 9 lb 2 oz, 4150 grams.  RML episiotomy performed 

and deep sutures required for repair.  Mom plans to breastfeed. 





19:  Normal postpartum course.  Vaginal bleeding is light to moderate, 

she denies passing clots.  Episiotomy is well approximated.  Hgb this am is 

down to 9.3, but vital stable and patient is asymptomatic.  Breastfeeding is 

improving.  Hard time staying latched through the night but had a good nursing 

this am per Mom. 





- Plan


Plan:: 





24 year old  at 39+5 weeks gestation with SROM and favorable cervix.  Not 

having contractions, so will start pitocin to augment.  SROM was at 0700 and at 

12:30 pm, still waiting for nursing staff to be able to start the pitocin.  

Reactive NST.





Plan:  Start pitocin augmentation


         Expectant management of labor, plan for vaginal delivery.


         Epidural when appropriate.


         Plans to breastfeed.  





19 2140:  Routine postpartum care.   ml, monitor for further 

bleeding and with episiotomy, watch for perineal hematoma.  


                       Breastfeeding support and education.





19:  continue routine postpartum care.  Using ibuprofen for analgesia.  


                Anemia - continue prenatal vitamin and iron supplement.


                Breastfeeding - continue support and education.


                Depression - continue usual dose of fluoxetine, 10 mg daily.

## 2020-01-01 RX ADMIN — FERROUS SULFATE TAB EC 324 MG (65 MG FE EQUIVALENT) SCH: 324 (65 FE) TABLET DELAYED RESPONSE at 13:21

## 2020-01-01 RX ADMIN — VITAMIN A, ASCORBIC ACID, CHOLECALCIFEROL, .ALPHA.-TOCOPHEROL ACETATE, DL-, THIAMINE MONONITRATE, RIBOFLAVIN, NIACINAMIDE, PYRIDOXINE HYDROCHLORIDE, FOLIC ACID, CYANOCOBALAMIN, CALCIUM CARBONATE, IRON, ZINC OXIDE, AND CUPRIC OXIDE SCH EACH: 4000; 120; 400; 22; 1.84; 3; 20; 10; 1; 12; 200; 29; 25; 2 TABLET ORAL at 10:27

## 2020-01-01 NOTE — [id number]
**Discharge Summary





- Hospital Course


Free Text/Narrative:: 





25 yo A1 at 39 +5 weeks gestation admitted after SROM at home at 0700 

today.  She had a large gush of clear fluid and then has continued to leak 

fluid since then.  She was scheduled for induction tonight.  She did notice a 

bit of bloody discharge as well after the gush.  She had not been having 

contractions and by noon when I went over to see her, she wasn't really having 

any contractions but was starting to feel some tightening.  THe unit had been 

really busy and Pitocin induction was started at 1310.  NST reactive on 

admission.  She is GBS negative.  Prenatal course has been unremarkable.  Her 

blood type is A positive, infection testing all negative.  Pap was normal.  1 

hour glucola was 135 so we did 3 hour GTT and all results were negative.  She 

does have a history of depression and has continued her regular dose of Prozac 

during her pregnancy and her mood has been stable.  She is on 10 mg of prozac.  

She plans to breastfeed.  Baby is a girl on ultrasound. 





She did start having contractions with the pitocin and was increased to a max 

of 14 mU/min.  She requested an epidural which was placed at about 1545.  She 

had good relief with the epidural.  She was 6 cm dilated by 1630 and then 

completely dilated at 1830.  We had her start pushing and she did well.  Baby 

tolerated second stage of labor.  FHR between pushes was  and then up to 

130-150 during pushes.  The perineum was tight around baby's head.  Lidocaine 

was locally infiltrated into the perineum and then a RML episiotomy was 

performed with a push and baby's head delivered with that set of pushes.  Baby 

was BELKYS.  There was no nuchal cord.  The shoulders and the rest of the baby 

delivered without difficulty. Time of delivery was  and it is a baby girl. 

  Mouth and nose were suctioned and then she was placed on mother's abdomen for 

further drying and stimulation.  Once the cord stopped pulsating, it was 

clamped and cut.  Baby was placed skin to skin.  Placenta delivered 

spontaneously at  and was intact, 3 vessels in the cord.  Pitocin IV bolus 

was started and bimanual massage was performed.  There was brisk flow of blood 

from the episiotomy site.  There was no extension to the episiotomy.  3-0 

vicryl was used to repair in the usual manner.  I had to place several deep 

sutures prior to completing the episiotomy repair.  EBL was 600 ml.  BP 

remained stable and patient was left in the delivery room with baby in stable 

condition.  Fundus was firm after repair complete and some clots were expressed 

from the uterus. 





She has done well postpartum.  Baby is breastfeeding regularly.  She does have 

some soreness and bruising of the nipples, but they are not cracked.  Discussed 

tips for good latch.  Breasts are still soft, but she is able to express 

colostrum.  Bleeding has slowed.  Perineal pain and cramping controlled with 

ibuprofen and tylenol.  She had a BM today and has been passing gas.  She is 

ambulating well and appetite has been good.  EPDS score today was 0.  She has 

been taking her prozac 10 mg daily.  She is anemic with Hgb of 8.9 today, but 

she is asymptomatic.


Diagnosis: Stroke: No


Modified [place] Scale: No Symptoms at All


Modified [place] Scale Score: 0





- Discharge Data


Discharge Date: 20


Discharge Disposition: Home, Self-Care 01


Condition: Good





- Referral to Home Health


Primary Care Physician: 


Gilma Liu MD








- Discharge Diagnosis/Problem(s)


(1) SROM (spontaneous rupture of membranes)


SNOMED Code(s): 174053467


   ICD Code: SPU9316 -    Status: Acute   Current Visit: Yes   





(2) 39 weeks gestation of pregnancy


SNOMED Code(s): 74811923


   ICD Code: Z3A.39 - 39 WEEKS GESTATION OF PREGNANCY   Status: Resolved   

Current Visit: Yes   





(3) Normal spontaneous vaginal delivery


SNOMED Code(s): 26553567, 687150518


   ICD Code: O80 - ENCOUNTER FOR FULL-TERM UNCOMPLICATED DELIVERY   Status: 

Acute   Current Visit: Yes   





(4) Mother currently breast-feeding


SNOMED Code(s): 516646725


   ICD Code: Z39.1 - ENCOUNTER FOR CARE AND EXAMINATION OF LACTATING MOTHER   

Status: Acute   Current Visit: Yes   





(5) Depression affecting pregnancy


SNOMED Code(s): 06476626812592


   ICD Code: O99.340 - OTH MENTAL DISORDERS COMPLICATING PREGNANCY, UNSP 

TRIMESTER; F32.9 - MAJOR DEPRESSIVE DISORDER, SINGLE EPISODE, UNSPECIFIED   

Status: Acute   Current Visit: Yes   





(6) Anemia


SNOMED Code(s): 023440276


   ICD Code: D64.9 - ANEMIA, UNSPECIFIED   Status: Acute   Current Visit: Yes   


Qualifiers: 


   Anemia type: other cause 





- Patient Instructions


Diet: Regular Diet as Tolerated, Drink 8-10+ Glasses/Day


Diet, Other: Try to eat foods high in iron


Activity: Apply Ice, As Tolerated


Driving: May Drive Today


Showering/Bathing: May Shower


Wound/Incision Care: Keep Operative Site/Wound Site Clean and Dry


Notify Provider of: Fever, Increased Pain, Swelling and Redness, Drainage, 

Nausea and/or Vomiting


Other/Special Instructions: You are anemic.  Take your prenatal vitamin and an 

extra iron supplement tablet with a Vitamin C 500 mg tablet to help your body 

absorb it.  Try to eat foods that are high in iron. Stay well hydrated and make 

position changes slowly.





- Discharge Plan


*PRESCRIPTION DRUG MONITORING PROGRAM REVIEWED*: No


*COPY OF PRESCRIPTION DRUG MONITORING REPORT IN PATIENT NATE: No


Home Medications: 


 Home Meds





FLUoxetine [PROzac] 10 mg PO DAILY 19 [History]


Pnv #30/Iron Carb&Aspg/Fa/Om3 [OB Complete with DHA Softgel] 1 each PO DAILY  [History]


Vit B6/Me-Thfolate/Me-B12/Ala [[place] Capsule] 1 tab PO DAILY 19 [History]


Acetaminophen [Tylenol] 650 mg PO Q4H PRN  tablet 20 [Rx]


Benzocaine/Menthol [Dermoplast Pain Relief Spray] 1 applic TOP ASDIRECTED PRN  

canister 20 [Rx]


Docusate Sodium [Colace] 100 mg PO BID  cap 20 [Rx]


Ferrous Sulfate 324 mg PO WITHBREAKFAST  tab.ec 20 [Rx]


Hydrocortisone Acetate [Anucort-HC] 25 mg RECTAL BID PRN  supp 20 [Rx]


Ibuprofen [Motrin] 600 mg PO Q6H PRN  tablet 20 [Rx]


Simethicone 80 mg PO Q4H PRN  tab.chew 20 [Rx]


Witch Hazel [Tucks] 1 pad TOP ASDIRECTED PRN  pad 20 [Rx]








Oxygen Therapy Mode: Room Air


Patient Handouts:  Breastfeeding and Inducing Lactation, Rooming-In With Your 

, Breastfeeding and Mastitis, Home Care Instructions for Mom, Vaginal 

Delivery, Care After, Breast Engorgement, SIDS Prevention Information, Easy-to-

Read, Breastfeeding Tips for a Good Latch, Storing Breast Milk





- Discharge Summary/Plan Comment


DC Time >30 min.: No


Discharge Summary/Plan Comment: 


25 yo A1 female who had  of a healthy baby girl.  RML episiotomy was 

performed and repaired.  She had increased blood loss related to the episiotomy 

and EBL was 600 ml.  HGB has dropped, but she is asymptomatic.  Fundus has been 

firm and bleeding is slowing.  Pain controlled with ibuprofen and tylenol.  She 

is exclusively breastfeeding and doing well. Some soreness of the nipples with 

bruising so reinforced tips for a good latch.  Depression is stable on prozac 

10 mg daily with EPDS score of 0.





Plan:  


Routine postpartum instructions.


Breastfeed on demand.  Discussed engorgement and management.  Follow up with 

lactation clinic on 20 and follow up with Dr. Liu in the clinic 20.


Anemia - continue PNV daily and add in iron supplement with Vitamin C 500 mg 

daily.


Depression - continue prozac 10 mg daily.          








- General Info


Date of Service: 20


Admission Dx/Problem (Free Text: 


 Patient Status Order with Admit Dx/Problem





19 10:49


Patient Status [ADT] Routine 





19 14:27


Patient Status [ADT] Routine 








 Admission Diagnosis/Problem











Admission Diagnosis/Problem    Term pregnancy














Subjective Update: 


She is doing well.  Breasts still soft, but breastfeeding regularly.  Some 

bruising of the nipples.  Bleeding is slowing, fundus firm.  Hgb today is down 

a bit further to 8.9, but she is asymptomatic.  Mood has been good, she is calm 

and happy and bonding well with baby.  Cramping and perineal pain controlled 

with ibuprofen and tylenol and heating pad.  





Functional Status: Reports: Pain Controlled, Tolerating Diet, Ambulating, 

Urinating





- Review of Systems


General: Reports: No Symptoms


HEENT: Reports: No Symptoms


Pulmonary: Reports: No Symptoms


Cardiovascular: Reports: Edema


Gastrointestinal: Reports: No Symptoms


Genitourinary: Reports: No Symptoms


Musculoskeletal: Reports: No Symptoms


Skin: Reports: No Symptoms


Neurological: Reports: No Symptoms


Psychiatric: Reports: No Symptoms





- Patient Data


Vitals - Most Recent: 


 Last Vital Signs











Temp  36.7 C   01/01/20 09:44


 


Pulse  105 H  20 09:44


 


Resp  14   20 09:44


 


BP  116/67   20 09:44


 


Pulse Ox  98   20 09:44











Weight - Most Recent: 86.636 kg


I&O - Last 24 hours: 


 Intake & Output











 19





 22:59 06:59 14:59


 


Intake Total 480  300


 


Balance 480  300











Lab Results - Last 24 hrs: 


 Laboratory Results - last 24 hr











  20 Range/Units





  07:46 


 


WBC  12.24 H  (3.98-10.04)  K/mm3


 


RBC  2.96 L  (3.98-5.22)  M/mm3


 


Hgb  8.9 L  (11.2-15.7)  gm/dl


 


Hct  27.4 L  (34.1-44.9)  %


 


MCV  92.6  (79.4-94.8)  fl


 


MCH  30.1  (25.6-32.2)  pg


 


MCHC  32.5  (32.2-35.5)  g/dl


 


RDW Std Deviation  47.0 H  (36.4-46.3)  fL


 


Plt Count  208  (182-369)  K/mm3


 


MPV  10.0  (9.4-12.3)  fl











Med Orders - Current: 


 Current Medications





Acetaminophen (Tylenol)  650 mg PO Q4H PRN


   PRN Reason: mild pain or fever


   Last Admin: 19 17:59 Dose:  650 mg


Benzocaine/Menthol (Dermoplast Pain Relief Spray)  0 gm TOP ASDIRECTED PRN


   PRN Reason: Perineal Comfort Measure


   Last Admin: 19 22:19 Dose:  1 canister


Docusate Sodium (Colace)  100 mg PO BID [facility]


   Last Admin: 20 10:26 Dose:  100 mg


Ferrous Sulfate (Ferrous Sulfate)  324 mg PO WITHBREAKFAST [facility]


   Last Admin: 19 08:40 Dose:  324 mg


Fluoxetine HCl (Prozac)  10 mg PO DAILY [facility]


   Last Admin: 20 11:38 Dose:  Not Given


Hydrocortisone Acetate (Anucort-Hc)  25 mg RECTAL BID PRN


   PRN Reason: Hemorrhoid pain


Ibuprofen (Motrin)  800 mg PO Q6H PRN


   PRN Reason: Mild pain or fever


   Last Admin: 20 10:27 Dose:  800 mg


Ibuprofen (Motrin)  600 mg PO Q6H PRN


   PRN Reason: Perineal Comfort Measure


Prenat Multivit/[place]/Iron/Folic Ac (Prenatal Plus Iron)  1 each PO DAILY FABBY


   Last Admin: 20 10:27 Dose:  1 each


Simethicone (Simethicone)  80 mg PO Q4H PRN


   PRN Reason: Gas


Witch Hazel (Tucks)  1 pad TOP ASDIRECTED PRN


   PRN Reason: Perineal Comfort Measure


   Last Admin: 19 22:27 Dose:  1 tube





Discontinued Medications





Bupivacaine HCl (Sensorcaine-Mpf 0.25%)  10 ml .ROUTE .[place]-MED ONE


   Stop: 19 00:01


Diphenhydramine HCl (Benadryl)  25 mg IVPUSH Q6H PRN


   PRN Reason: pruritis


Ephedrine Sulfate (Ephedrine Sulfate)  5 mg IVPUSH ASDIRECTED PRN


   PRN Reason: Hypotension


Fentanyl (Sublimaze)  100 mcg EPIDUR Q3H PRN


   PRN Reason: Pain


   Last Admin: 19 15:29 Dose:  100 mcg


Fentanyl/Bupivacaine HCl (Fentanyl/Bupivacaine/Ns 2 Mcg-0.125% 250 Ml)  250 ml 

EPIDUR CONTINUOUS PRN


   PRN Reason: Pain


   Last Admin: 19 15:30 Dose:  250 ml


Lactated Ringer's (Ringers, Lactated)  1,000 mls @ 100 mls/hr IV ASDIRECTED FABBY


   Last Admin: 19 17:04 Dose:  100 mls/hr


Oxytocin/Lactated Ringer's (Pitocin In Lr 10 Units/1,000 Ml)  10 unit in 1,000 

mls @ 12 mls/hr IV TITRATE FABBY; Protocol


   Last Titration: 19 20:16 Dose:  500 mls/hr


Oxytocin/Lactated Ringer's (Pitocin In Lr 10 Units/1,000 Ml)  10 unit in 1,000 

mls @ 100 mls/hr IV .CONTINUOUS FABBY; Protocol


   Last Admin: 19 21:35 Dose:  250 mls/hr


Lidocaine HCl (Xylocaine 1%) Confirm Administered Dose 50 ml .ROUTE .[facility]


   Stop: 19 20:12


   Last Admin: 19 20:10 Dose:  50 ml


Nalbuphine HCl (Nubain)  10 mg IVPUSH Q2H PRN


   PRN Reason: Pain


Nalbuphine HCl (Nubain)  10 mg IVPUSH Q2H PRN


   PRN Reason: Pain


Ondansetron HCl (Zofran)  4 mg IVPUSH Q4H PRN


   PRN Reason: Nausea


   Last Admin: 19 15:40 Dose:  4 mg


Sodium Chloride (Saline Flush)  10 ml FLUSH ASDIRECTED PRN


   PRN Reason: Keep Vein Open











- Exam


General: Reports: Alert, Oriented, Cooperative, No Acute Distress


HEENT: Reports: Pupils Equal, Mucous Membr. Moist/Pink


Neck: Reports: Supple


Lungs: Reports: Normal Respiratory Effort


Cardiovascular: Reports: Regular Rate, Regular Rhythm


GI/Abdominal Exam: Normal Bowel Sounds, Soft


 (Female) Exam: Fundal Height (1 finger below umbilicus), Vaginal Bleeding (

light), Other (episiotomy is well approximated, decreased perineal swelling 

noted.  )


Rectal (Female) Exam: Deferred


Back Exam: Reports: Normal Inspection, Full Range of Motion


Extremities: Pedal Edema


Skin: Reports: Warm, Dry, Intact


Wound/Incisions: Reports: Healing Well


Neurological: Reports: No New Focal Deficit


Psy/Mental Status: Reports: Alert, Normal Affect, Normal Mood

## 2021-05-04 NOTE — PCM.LDHP
L&D History of Present Illness





- General


Date of Service: 05/04/21


Admit Problem/Dx: 


                   Patient Status Order with Admit Dx/Problem





05/04/21 19:39


Patient Status [ADT] Routine 








                           Admission Diagnosis/Problem











Admission Diagnosis/Problem    Pregnancy














Source of Information: Patient


History Limitations: Reports: No Limitations





- History of Present Illness


Introduction:: 





24 yo G3A1P1 at 39+3 weeks gestation admitted for elective induction of labor 

due to term and hx of large baby (9lb 2oz).  She was having contractions last 

night and took a Unisom and was able to get some sleep. She has been feeling 

contractions since this afternoon and more pelvic pressure.  She is GBS 

negative.  Blood type A positive.  Her 1 hour glucola was wnl.  Her infectious 

disease screenings were all negative including Hep C ab.  Pregnancy has been 

complicated by depression and she has been on fluoxetine throughout the 

pregnancy and dose was increased to her current 20 mg daily dose on 3/12/21.  

Her mood has been stable on this dose with a PHQ-9 score of 2 on 4/8/21. She 

plans to breastfeed with this baby and she did breastfeed with her first.  She 

would like a circumcision if they have a boy.  





On admission, NST is reactive, no decels and contractions noted about every 3 

minutes.  Moderate variability and good accelerations noted.  COVID test was d

one on admission.  On exam, cervix is anterior, 3cm and soft and 80% effaced.  

Vertex presentation, station 0 and intact membranes.  Discussed with patient and

 will do AROM to augment her early labor.  AROM performed at 1945 and clear 

fluid drained.  





- Related Data


Allergies/Adverse Reactions: 


                                    Allergies











Allergy/AdvReac Type Severity Reaction Status Date / Time


 


doxycycline Allergy  Anaphylactic Verified 05/04/21 19:49





   Shock  











Home Medications: 


                                    Home Meds





FLUoxetine [PROzac] 10 mg PO DAILY 12/30/19 [History]


Pnv #30/Iron Carb&Aspg/Fa/Om3 [OB Complete with DHA Softgel] 1 each PO DAILY 

12/30/19 [History]


Vit B6/Me-Thfolate/Me-B12/Ala [Nufola Capsule] 1 tab PO DAILY 12/30/19 [History]


Acetaminophen [Tylenol] 650 mg PO Q4H PRN  tablet 01/01/20 [Rx]


Benzocaine/Menthol [Dermoplast Pain Relief Spray] 1 applic TOP ASDIRECTED PRN  

canister 01/01/20 [Rx]


Docusate Sodium [Colace] 100 mg PO BID  cap 01/01/20 [Rx]


Ferrous Sulfate 324 mg PO WITHBREAKFAST  tab.ec 01/01/20 [Rx]


Hydrocortisone Acetate [Anucort-HC] 25 mg RECTAL BID PRN  supp 01/01/20 [Rx]


Ibuprofen [Motrin] 600 mg PO Q6H PRN  tablet 01/01/20 [Rx]


Simethicone 80 mg PO Q4H PRN  tab.chew 01/01/20 [Rx]


witch hazeL [Tucks] 1 pad TOP ASDIRECTED PRN  pad 01/01/20 [Rx]











Past Medical History


OB/GYN History: Reports: Pregnancy


Psychiatric History: Reports: Depression





- Past Surgical History


HEENT Surgical History: Reports: Oral Surgery, Other (See Below)


Other HEENT Surgeries/Procedures: wisdom teeth extraction





Social & Family History





- Family History


Family Medical History: No Pertinent Family History





- Tobacco Use


Tobacco Use Status *Q: Never Tobacco User


Tobacco Use Within Last Twelve Months: No





- Caffeine Use


Caffeine Use: Reports: None





- Alcohol Use


Alcohol Use History: No





- Recreational Drug Use


Recreational Drug Use: No


Drug Use in Last 12 Months: No





- Living Situation & Occupation


Living situation: Reports: 


Occupation: Unemployed





H&P Review of Systems





- Review of Systems:


Review Of Systems: See Below


General: Reports: No Symptoms


HEENT: Reports: No Symptoms


Pulmonary: Reports: No Symptoms


Cardiovascular: Reports: No Symptoms


Gastrointestinal: Reports: No Symptoms


Genitourinary: Reports: No Symptoms


Musculoskeletal: Reports: No Symptoms


Skin: Reports: No Symptoms


Psychiatric: Reports: No Symptoms


Neurological: Reports: No Symptoms


Hematologic/Lymphatic: Reports: No Symptoms


Immunologic: Reports: No Symptoms





L&D Exam





- Exam


Exam: See Below





- Vital Signs


Weight: 83.506 kg





- OB Specific


Contraction Intensity: Mild


Fetal Movement: Active


Fetal Heart Tones: Present


Fetal Heart Tones per Min: 120


Fetal Heart Rate (FHR) Variability: Moderate (6-25 bmp)


Birth Presentation: Vertex


Estimated Fetal Weight: 9 lb





- Kent Score


Kent Score Cervix Position: Anterior


Kent Score Consistency: Soft


Kent Score Effacement: >80%


Kent Score Dilation: 1-2 cm


Kent Score Infant's Station: -1 ,0


Kent Score Total: 10





- Exam


General: Alert, Oriented


HEENT: Conjunctiva Clear, Mucosa Moist & Pink, Pupils Equal


Neck: Supple, Trachea Midline


Lungs: Normal Respiratory Effort


Cardiovascular: Regular Rate, Regular Rhythm


GI/Abdominal Exam: Normal Bowel Sounds


Rectal Exam: Deferred


Genitourinary: Normal external exam, Cervical dilitation


Back Exam: Normal Inspection, Full Range of Motion


Extremities: Pedal Edema (trace)


Skin: Warm, Dry, Intact





- Problem List


(1) 39 weeks gestation of pregnancy


SNOMED Code(s): 82617773


   ICD Code: Z3A.39 - 39 WEEKS GESTATION OF PREGNANCY   Status: Acute   Current 

Visit: Yes   





(2) Depression affecting pregnancy


SNOMED Code(s): 14281482289569


   ICD Code: O99.340 - OTH MENTAL DISORDERS COMPLICATING PREGNANCY, UNSP 

TRIMESTER; F32.9 - MAJOR DEPRESSIVE DISORDER, SINGLE EPISODE, UNSPECIFIED   

Status: Acute   Current Visit: No   


Problem List Initiated/Reviewed/Updated: Yes


Orders Last 24hrs: 


                               Active Orders 24 hr











 Category Date Time Status


 


 Patient Status [ADT] Routine ADT  05/04/21 19:39 Ordered


 


 Activity as Tolerated [RC] PFP Care  05/04/21 19:38 Ordered


 


 Ambulate [RC] PER UNIT ROUTINE Care  05/04/21 19:38 Ordered


 


 Communication Order [RC] ASDIRECTED Care  05/04/21 19:38 Ordered


 


 Fetal Heart Tones [RC] ASDIRECTED Care  05/04/21 19:42 Ordered


 


 Fetal Non Stress Test [RC] PER UNIT ROUTINE Care  05/04/21 19:38 Ordered


 


 Intake and Output [RC] QSHIFT Care  05/04/21 19:43 Ordered


 


 Notify Provider [RC] PFP Care  05/04/21 19:38 Ordered


 


 Notify Provider [RC] PRN Care  05/04/21 19:38 Ordered


 


 Peripheral IV Care [RC] .AS DIRECTED Care  05/04/21 19:42 Ordered


 


 Urinary Catheter Assessment [RC] ASDIRECTED Care  05/04/21 19:38 Ordered


 


 VTE/DVT Education [RC] PER UNIT ROUTINE Care  05/04/21 19:45 Ordered


 


 Vital Signs [RC] PER UNIT ROUTINE Care  05/04/21 19:38 Ordered


 


 Clear Liquid Diet [DIET] Diet  05/04/21 Dinner Ordered


 


 CBC W/O DIFF,HEMOGRAM [HEME] Stat Lab  05/04/21 19:38 Ordered


 


 CORONAVIRUS COVID-19 SALLY [MOLEC] Stat Lab  05/04/21 19:17 Ordered


 


 RAPID PLASMA REAGIN,RPR [CHEM] Routine Lab  05/04/21 19:38 Ordered


 


 TYPE AND SCREEN [BBK] Stat Lab  05/04/21 19:38 Ordered


 


 Calcium Carbonate [Tums] Med  05/04/21 19:38 Ordered





 1,000 mg PO Q2H PRN   


 


 Lactated Ringers [Ringers, Lactated] 1,000 ml Med  05/04/21 19:45 Ordered





 IV ASDIRECTED   


 


 Lidocaine 1% [Xylocaine 1%] Med  05/04/21 19:38 Once





 20 ml INJECT ONETIME ONE   


 


 Nalbuphine [Nubain] Med  05/04/21 19:38 Ordered





 10 mg IVPUSH Q2H PRN   


 


 Ondansetron [Zofran] Med  05/04/21 19:38 Ordered





 4 mg IVPUSH Q4H PRN   


 


 Oxytocin/Lactated Ringers [Pitocin in LR 10 Units/1,000 Med  05/04/21 19:45 

Ordered





 ML]   





 10 unit in 1,000 ml IV .CONTINUOUS   


 


 Oxytocin/Lactated Ringers [Pitocin in LR 10 Units/1,000 Med  05/04/21 19:45 

Ordered





 ML]   





 10 unit in 1,000 ml IV TITRATE   


 


 Sodium Chloride 0.9% [Saline Flush] Med  05/04/21 19:38 Ordered





 10 ml FLUSH ASDIRECTED PRN   


 


 DVT/VTE Prophylaxis Reflex [OM.PC] Routine Oth  05/04/21 19:44 Ordered


 


 Electronic Fetal Heart Tones Ext w TOCO [WOMSER] Oth  05/04/21 19:38 Ordered





 Routine   


 


 Electronic Fetal Heart Tones Internal [WOMSER] Per Unit Ot  05/04/21 19:38 

Ordered





 Routine   


 


 Peripheral IV Insertion Adult [OM.PC] Routine Oth  05/04/21 19:38 Ordered


 


 Telemetry Fetal Monitoring [WOMSER] Routine Ot  05/04/21 19:38 Ordered


 


 Resuscitation Status Routine Resus Stat  05/04/21 19:38 Ordered











Assessment/Plan Comment:: 





Pregnancy at 39+3 weeks gestation with spontaneous onset of labor.  Contractions

 mild but every 3 minutes, 3cm dilated, 80% effaced with intact membranes.  

Definite change from last week in the clinic.  GBS negative.  Pregnancy 

complicated by depression, well controlled on fluoxetine 20 mg daily.





Plan:  AROM to augment labor and then add pitocin if needed.


        Anticipate vaginal delivery.


        Plans to breastfeed.

## 2021-05-04 NOTE — PCM.PREANE
Preanesthetic Assessment





- Procedure


Proposed Procedure: 





Labor Epidural





- Anesthesia/Transfusion/Family Hx


Anesthesia History: Prior Anesthesia Without Reaction


Family History of Anesthesia Reaction: No


Transfusion History: No Prior Transfusion(s)





- Review of Systems


General: No Symptoms


Pulmonary: No Symptoms


Cardiovascular: No Symptoms


Gastrointestinal: Abdominal Pain (Contractions), Nausea


Neurological: No Symptoms


Other: Reports: Depression





- Physical Assessment


Height: 1.6 m


Weight: 83.506 kg


ASA Class: 2


Mental Status: Alert & Oriented x3


Airway Class: Mallampati = 1


Dentition: Reports: Normal Dentition


Thyro-Mental Finger Breadths: 3


Mouth Opening Finger Breadths: 3


ROM/Head Extension: Full


Lungs: Clear to Auscultation, Normal Respiratory Effort


Cardiovascular: Regular Rate, Regular Rhythm





- Lab


Values: 





                             Laboratory Last Values











WBC  10.51 K/mm3 (3.98-10.04)  H  05/04/21  20:08    


 


RBC  4.36 M/mm3 (3.98-5.22)   05/04/21  20:08    


 


Hgb  13.0 gm/dl (11.2-15.7)   05/04/21  20:08    


 


Hct  39.2 % (34.1-44.9)   05/04/21  20:08    


 


MCV  89.9 fl (79.4-94.8)  D 05/04/21  20:08    


 


MCH  29.8 pg (25.6-32.2)   05/04/21  20:08    


 


MCHC  33.2 g/dl (32.2-35.5)   05/04/21  20:08    


 


RDW Std Deviation  48.2 fL (36.4-46.3)  H  05/04/21  20:08    


 


Plt Count  214 K/mm3 (182-369)   05/04/21  20:08    


 


MPV  10.4 fl (9.4-12.3)   05/04/21  20:08    


 


RPR  Non-reactive  (NONREACTIVE)   05/04/21  20:08    


 


SARS-CoV-2 RNA (SALLY)  Negative  (NEGATIVE)   05/04/21  19:07    


 


Blood Type  A POSITIVE   05/04/21  20:08    


 


Gel Antibody Screen  Negative   05/04/21  20:08    














- Allergies


Allergies/Adverse Reactions: 


                                    Allergies











Allergy/AdvReac Type Severity Reaction Status Date / Time


 


doxycycline Allergy  Anaphylactic Verified 05/04/21 19:49





   Shock  














- Acknowledgements


Anesthesia Type Planned: Epidural


Pt an Appropriate Candidate for the Planned Anesthesia: Yes


Alternatives and Risks of Anesthesia Discussed w Pt/Guardian: Yes


Pt/Guardian Understands and Agrees with Anesthesia Plan: Yes





PreAnesthesia Questionnaire


OB/GYN History: Reports: Pregnancy


Psychiatric History: Reports: Depression





- Past Surgical History


HEENT Surgical History: Reports: Oral Surgery, Other (See Below)


Other HEENT Surgeries/Procedures: wisdom teeth extraction





- SUBSTANCE USE


Tobacco Use Status *Q: Never Tobacco User


Tobacco Use Within Last Twelve Months: No


Recreational Drug Use History: No





- HOME MEDS


Home Medications: 


                                    Home Meds





FLUoxetine [PROzac] 20 mg PO DAILY 12/30/19 [History]


Calcium Carbonate [Calcium] 500 mg PO DAILY 05/04/21 [History]


Cholecalciferol (Vitamin D3) [Vitamin D] 1 tab PO DAILY 05/04/21 [History]


Prenatal Vits #93/Iron Fum/FA [Prenatal Formula Tablet] 1 tab PO DAILY 05/04/21 

[History]











- CURRENT (IN HOUSE) MEDS


Current Meds: 





                               Current Medications





Calcium Carbonate/Glycine (Calcium Carbonate 500 Mg Tab.Chew)  1,000 mg PO Q2H 

PRN


   PRN Reason: Indigestion


Diphenhydramine HCl (Diphenhydramine 50 Mg/Ml Sdv)  25 mg IVPUSH Q6H PRN


   PRN Reason: pruritis


Ephedrine Sulfate (Ephedrine 50 Mg/Ml Sdv)  5 mg IVPUSH ASDIRECTED PRN


   PRN Reason: Hypotension


Fentanyl (Fentanyl 100 Mcg/2 Ml Sdv)  100 mcg EPIDUR Q3H PRN


   PRN Reason: Pain


   Last Admin: 05/04/21 20:59 Dose:  100 mcg


   Documented by: 


Fentanyl/Bupivacaine HCl (Bupivacaine/Fentanyl/Ns 100 Ml Bag)  100 ml EPIDUR 

ASDIRECTED PRN


   PRN Reason: Pain


   Last Admin: 05/04/21 20:59 Dose:  100 ml


   Documented by: 


Lactated Ringer's (Ringers, Lactated)  1,000 mls @ 100 mls/hr IV ASDIRECTED FABBY


   Last Admin: 05/04/21 21:57 Dose:  100 mls/hr


   Documented by: 


Oxytocin/Lactated Ringer's (Pitocin In Lr 10 Units/1,000 Ml)  10 unit in 1,000 m

ls @ 500 mls/hr IV .CONTINUOUS FABBY


Oxytocin/Lactated Ringer's (Pitocin In Lr 10 Units/1,000 Ml)  10 unit in 1,000 

mls @ 12 mls/hr IV TITRATE FABBY; Protocol


Nalbuphine HCl (Nalbuphine 10 Mg/1 Ml Vial)  10 mg IVPUSH Q2H PRN


   PRN Reason: Pain


Ondansetron HCl (Ondansetron 4 Mg/2 Ml Sdv)  4 mg IVPUSH Q4H PRN


   PRN Reason: Nausea/Vomiting


Sodium Chloride (Sodium Chloride 0.9% 10 Ml Syringe)  10 ml FLUSH ASDIRECTED PRN


   PRN Reason: Keep Vein Open





Discontinued Medications





Lidocaine HCl (Lidocaine 1% 50 Ml Mdv)  20 ml INJECT ONETIME ONE


   Stop: 05/04/21 19:39

## 2021-05-05 NOTE — PCM.DEL
L & D Note





- General Info


Date of Service: 21


Mother's Due Date: 21





- Delivery Note


Labor: Spontaneous, Augmented by ARM, Augmented by Oxytocin


Delivery Outcome: Livebirth


Infant Delivery Method: Spontaneous Vaginal Delivery-Single


Infant Delivery Mode: Spontaneous


Birth Presentation: Left Occiput Anterior (ROZ)


Nuchal Cord: Present (1 loop, easily reduced)


Anesthesia Type: Epidural


Amniotic Fluid Description: Clear


Episiotomy Type: Right Mediolateral


Laceration: Periurethral


Suture type: Vicryl


Suture size: 3-0


Placenta: Intact, Spontaneous


Cord: 3 Vessels


Estimated Blood Loss: 100


Resuscitation Needed: Yes


: Suctioned, Bulb Syringe, Stimulated, Warmed, Warmer Used (PPV and then 

blow by oxygen)


 Provider: Gilma Liu


APGAR Score 1 min: 2


APGAR Score 5 min: 6


APGAR Score 10 min: 8 (1 off for color and tone)


Delivery Comments (Free Text/Narrative):: 


26 yo G3A1P1 at 39+3 weeks gestation admitted for elective induction of labor 

due to term and hx of large baby (9lb 2oz).  She was having contractions last 

night and took a Unisom and was able to get some sleep. She has been feeling 

contractions since this afternoon and more pelvic pressure.  She is GBS 

negative.  Blood type A positive.  Her 1 hour glucola was wnl.  Her infectious 

disease screenings were all negative including Hep C ab.  Pregnancy has been 

complicated by depression and she has been on fluoxetine throughout the 

pregnancy and dose was increased to her current 20 mg daily dose on 3/12/21.  

Her mood has been stable on this dose with a PHQ-9 score of 2 on 21. She 

plans to breastfeed with this baby and she did breastfeed with her first.  She 

would like a circumcision if they have a boy.  





On admission, NST is reactive, no decels and contractions noted about every 3 

minutes.  Moderate variability and good accelerations noted.  COVID test was 

done on admission and was negative.   On exam, cervix is anterior, 3cm and soft 

and 80% effaced.  Vertex presentation, station 0 and intact membranes.  

Discussed with patient and will do AROM to augment her early labor.  AROM 

performed at 1945 and clear fluid drained. Epidural placed and dosed at 2130 and

she was comfortable.  Pitocin IV was then started to augment labor and was 

increased to a max of 8 mu/min.  She was complete by 0200 and I was called to 

come in.  We started pushing at 0221 and she did very well with pushing.  Baby 

tolerated second stage of labor.  The perineum was tight, so I did perform RML 

episiotomy and baby delivered with next set of pushes.  Baby delivered from ROZ 

presentation .  There was nuchal cord x 1 that easily slipped over baby's head 

and then the shoulders delivered without difficulty.  It was a baby boy.  Time 

of delivery was 0253. Pitocin IV bolus was started. He was dried and stimulated 

and bulb suction use to suction mouth and nose and then baby was placed on 

mother's abdomen.  He was not crying, so the cord was clamped and cut and baby 

brought over to the [facility] warmer.  PPV was performed with bag and mask.  HR was 

>100.  Initially not getting a good seal so baby repositioned and a smaller mask

was used to provide PPV and then we started noticing spontaneous respirations at

about 5 minutes.  oxygen saturation was 74%, so blow by oxygen was then provided

with 100% oxygen and sats increased to 94-96%.  He still did not have good tone,

temperature probe placed and baby was left on the warmer to get temp up to 

normal.  Bedside glucose was 81.  He was not having any nasal flaring or 

retractions, no grunting.  Lungs clear to auscultation and equal bilaterally and

heart sounds wnl, no murmur noted.  Apgars were 2, 6 and 8 at 1, 5 and 10 

minutes respectively.  Birth weight was 4010 grams (8 lb 13.4oz).





I then went back to [facility] and placenta was sitting in vagina and delivered at 

0305.  There was no extension to the episiotomy and it was repaired in the usual

manner with 3-0 vicryl.  EBL was 100 ml.  Bimanual exam done after repair was 

complete and a few clots were expressed.  Uterus was firm and contracted.  Baby 

was brought over to Mom and placed skin to skin on her chest and then he vomited

a moderate amount of clear mucus and it was coming out his nose as well.  Bulb 

suctioning performed and then brought back to the warmer and Delee suction 

performed, but no further fluid obtained.  Baby then brought back over the Mom. 

He was starting to have some grunting, but no nasal flaring and tone improving. 

RR was 41 and .  Mom and baby were left in delivery room in stable 

condition and close monitoring of baby.  She plans to breastfeed.  Baby had not 

latched by 0410.





Induction Criteria





- Kent Score


Kent Score Dilation: 3-4 cm


Kent Score Effacement: >80%


Kent Score Infant's Station: -1 ,0


Kent Score Consistency: Soft


Kent Score Cervix Position: Anterior


Kent Score Total: 11


Kent Score Presenting Part: Reports: Cephalic





- Augmentation


Estimated Pelvis: Reports: Adequate


Fetal Weight Estimated:: Reports: LGA


Estimated Weight if LGA: 4.082 kg


Reassuring Fetal Monitoring Strip: Yes


Absence of Tachy Systole: Yes





- General Info


Date of Service: 21


Functional Status: Reports: Pain Controlled





- Review of Systems


General: Reports: Fatigue


HEENT: Reports: No Symptoms


Pulmonary: Reports: No Symptoms


Cardiovascular: Reports: No Symptoms


Gastrointestinal: Reports: Nausea, Vomiting (nausea and vomiting during second 

stage, but none after delivery)


Genitourinary: Reports: No Symptoms


Musculoskeletal: Reports: No Symptoms


Skin: Reports: No Symptoms


Neurological: Reports: No Symptoms


Psychiatric: Reports: No Symptoms





- Patient Data


Vitals - Most Recent: 


                                Last Vital Signs











Temp  36.4 C   21 18:53


 


Pulse  99   21 18:53


 


Resp  14   21 18:53


 


BP  127/85   21 18:53


 


Pulse Ox  99   21 18:53











Weight - Most Recent: 83.506 kg


Lab Results Last 24 Hours: 


                         Laboratory Results - last 24 hr











  21 Range/Units





  19:07 20:08 20:08 


 


WBC    10.51 H  (3.98-10.04)  K/mm3


 


RBC    4.36  (3.98-5.22)  M/mm3


 


Hgb    13.0  (11.2-15.7)  gm/dl


 


Hct    39.2  (34.1-44.9)  %


 


MCV    89.9  D  (79.4-94.8)  fl


 


MCH    29.8  (25.6-32.2)  pg


 


MCHC    33.2  (32.2-35.5)  g/dl


 


RDW Std Deviation    48.2 H  (36.4-46.3)  fL


 


Plt Count    214  (182-369)  K/mm3


 


MPV    10.4  (9.4-12.3)  fl


 


RPR   Non-reactive   (NONREACTIVE)  


 


SARS-CoV-2 RNA (SALLY)  Negative    (NEGATIVE)  


 


Blood Type     


 


Gel Antibody Screen     














  21 Range/Units





  20:08 


 


WBC   (3.98-10.04)  K/mm3


 


RBC   (3.98-5.22)  M/mm3


 


Hgb   (11.2-15.7)  gm/dl


 


Hct   (34.1-44.9)  %


 


MCV   (79.4-94.8)  fl


 


MCH   (25.6-32.2)  pg


 


MCHC   (32.2-35.5)  g/dl


 


RDW Std Deviation   (36.4-46.3)  fL


 


Plt Count   (182-369)  K/mm3


 


MPV   (9.4-12.3)  fl


 


RPR   (NONREACTIVE)  


 


SARS-CoV-2 RNA (SALLY)   (NEGATIVE)  


 


Blood Type  A POSITIVE  


 


Gel Antibody Screen  Negative  











Med Orders - Current: 


                               Current Medications





Calcium Carbonate/Glycine (Calcium Carbonate 500 Mg Tab.Chew)  1,000 mg PO Q2H 

PRN


   PRN Reason: Indigestion


Diphenhydramine HCl (Diphenhydramine 50 Mg/Ml Sdv)  25 mg IVPUSH Q6H PRN


   PRN Reason: pruritis


Ephedrine Sulfate (Ephedrine 50 Mg/Ml Sdv)  5 mg IVPUSH ASDIRECTED PRN


   PRN Reason: Hypotension


   Last Admin: 21 22:50 Dose:  5 mg


   Documented by: 


Fentanyl (Fentanyl 100 Mcg/2 Ml Sdv)  100 mcg EPIDUR Q3H PRN


   PRN Reason: Pain


   Last Admin: 21 20:59 Dose:  100 mcg


   Documented by: 


Fentanyl/Bupivacaine HCl (Bupivacaine/Fentanyl/Ns 100 Ml Bag)  100 ml EPIDUR 

ASDIRECTED PRN


   PRN Reason: Pain


   Last Admin: 21 20:59 Dose:  100 ml


   Documented by: 


Lactated Ringer's (Ringers, Lactated)  1,000 mls @ 100 mls/hr IV ASDIRECTED FABBY


   Last Admin: 21 21:57 Dose:  100 mls/hr


   Documented by: 


Oxytocin/Lactated Ringer's (Pitocin In Lr 10 Units/1,000 Ml)  10 unit in 1,000 

mls @ 500 mls/hr IV .CONTINUOUS FABBY


Oxytocin/Lactated Ringer's (Pitocin In Lr 10 Units/1,000 Ml)  10 unit in 1,000 

mls @ 12 mls/hr IV TITRATE FABBY; Protocol


   Last Titration: 21 02:02 Dose:  0 munits/min, 0 mls/hr


   Documented by: 


Nalbuphine HCl (Nalbuphine 10 Mg/1 Ml Vial)  10 mg IVPUSH Q2H PRN


   PRN Reason: Pain


Ondansetron HCl (Ondansetron 4 Mg/2 Ml Sdv)  4 mg IVPUSH Q4H PRN


   PRN Reason: Nausea/Vomiting


Sodium Chloride (Sodium Chloride 0.9% 10 Ml Syringe)  10 ml FLUSH ASDIRECTED PRN


   PRN Reason: Keep Vein Open





Discontinued Medications





Lidocaine HCl (Lidocaine 1% 50 Ml Mdv)  20 ml INJECT ONETIME ONE


   Stop: 21 19:39











- Exam


General: Alert, Oriented, Cooperative


HEENT: Pupils Equal, Mucous Membr. Moist/Pink


Neck: Supple


Lungs: Normal Respiratory Effort


Cardiovascular: Regular Rate, Regular Rhythm


GI/Abdominal Exam: Normal Bowel Sounds, Soft


 (Female) Exam: Enlarged Uterus, Vaginal Bleeding, Other (RML episiotomy repai

red.  )


Back Exam: Normal Inspection


Extremities: Normal Inspection


Skin: Warm, Dry, Intact


Neurological: No New Focal Deficit


Psy/Mental Status: Alert, Normal Affect, Normal Mood





- Problem List & Annotations


(1) 39 weeks gestation of pregnancy


SNOMED Code(s): 99370875


   Code(s): Z3A.39 - 39 WEEKS GESTATION OF PREGNANCY   Status: Acute   Current 

Visit: Yes   





(2) Depression affecting pregnancy


SNOMED Code(s): 32233471563623


   Code(s): O99.340 - [facility] MENTAL DISORDERS COMPLICATING PREGNANCY, UNSP 

TRIMESTER; F32.9 - MAJOR DEPRESSIVE DISORDER, SINGLE EPISODE, UNSPECIFIED   

Status: Acute   Current Visit: No   





(3) Mother currently breast-feeding


SNOMED Code(s): 206873878


   Code(s): Z39.1 - ENCOUNTER FOR CARE AND EXAMINATION OF LACTATING MOTHER   

Status: Acute   Current Visit: No   





(4) Normal spontaneous vaginal delivery


SNOMED Code(s): 27100899, 210628974


   Code(s): O80 - ENCOUNTER FOR FULL-TERM UNCOMPLICATED DELIVERY   Status: Acute

  Current Visit: No   





- Problem List Review


Problem List Initiated/Reviewed/Updated: Yes





- My Orders


Last 24 Hours: 


My Active Orders





21 Dinner


Clear Liquid Diet [DIET] 





21 19:38


Activity as Tolerated [RC] PFP 


Ambulate [RC] PER UNIT ROUTINE 


Communication Order [RC] ASDIRECTED 


Fetal Non Stress Test [RC] PER UNIT ROUTINE 


Notify Provider [RC] PFP 


Notify Provider [RC] PRN 


Urinary Catheter Assessment [RC] ASDIRECTED 


Vital Signs [RC] PER UNIT ROUTINE 


Calcium Carbonate [Tums]   1,000 mg PO Q2H PRN 


Nalbuphine [Nubain]   10 mg IVPUSH Q2H PRN 


Ondansetron [Zofran]   4 mg IVPUSH Q4H PRN 


Sodium Chloride 0.9% [Saline Flush]   10 ml FLUSH ASDIRECTED PRN 


Electronic Fetal Heart Tones Ext w TOCO [WOMSER] Routine 


Electronic Fetal Heart Tones Internal [WOMSER] Per Unit Routine 


Peripheral IV Insertion Adult [OM.PC] Routine 


Telemetry Fetal Monitoring [WOMSER] Routine 


Resuscitation Status Routine 





21 19:39


Patient Status [ADT] Routine 





21 19:42


Fetal Heart Tones [RC] ASDIRECTED 


Peripheral IV Care [RC] .AS DIRECTED 





21 19:43


Intake and Output [RC] QSHIFT 





21 19:44


DVT/VTE Prophylaxis Reflex [OM.PC] Routine 





21 19:45


VTE/DVT Education [RC] PER UNIT ROUTINE 


Lactated Ringers [Ringers, Lactated] 1,000 ml IV ASDIRECTED 


Oxytocin/Lactated Ringers [Pitocin in LR 10 Units/1,000 ML] 10 unit in 1,000 ml 

IV .CONTINUOUS 


Oxytocin/Lactated Ringers [Pitocin in LR 10 Units/1,000 ML] 10 unit in 1,000 ml 

IV TITRATE 





21 03:36


Patient Status Manage Transfer [TRANSFER] Routine 














- Plan


Plan:: 





Pregnancy at 39+3 weeks gestation with spontaneous onset of labor.  Contractions

 mild but every 3 minutes, 3cm dilated, 80% effaced with intact membranes.  

Definite change from last week in the clinic.  GBS negative.  Pregnancy 

complicated by depression, well controlled on fluoxetine 20 mg daily.





Plan:  AROM to augment labor and then add pitocin if needed.


        Anticipate vaginal delivery.


        Plans to breastfeed. 


        





Postpartum 0415 on 5/5/21


Breastfeeding support and encouragement.


Routine postpartum care.


CBC in am.


Continue fluoxetine 20 mg daily and monitor for PPD.

## 2021-05-05 NOTE — [id number]
Post Anesthesia Note





- EVALUATION WITHIN 48HRS OF ANESTHETIC


Vital Signs in Normal Range: Yes


Patient Participated in Evaluation: Yes


Respiratory Function Stable: Yes


Airway Patent: Yes


Cardiovascular Function Stable: Yes


Hydration Status Stable: Yes


Pain Control Satisfactory: Yes


Nausea and Vomiting Control Satisfactory: Yes


Mental Status Recovered: Yes


Vital Signs: 


                                Last Vital Signs











Temp  36.4 C   05/04/21 18:53


 


Pulse  99   05/04/21 18:53


 


Resp  14   05/04/21 18:53


 


BP  127/85   05/04/21 18:53


 


Pulse Ox  99   05/04/21 18:53

## 2021-05-06 NOTE — PCM.PNPP
- General Info


Date of Service: 21


Admission Dx/Problem (Free Text): 


                   Patient Status Order with Admit Dx/Problem





21 19:39


Patient Status [ADT] Routine 








                           Admission Diagnosis/Problem











Admission Diagnosis/Problem    Pregnancy














Subjective Update: 





Patient is doing well.  Breastfeeding is going well, denies nipple pain or 

cracking.  She feels the left breast is lynne than the right since baby has 

been favoring the left breast.  Bleeding has been moderate.  Cramping noted when

 breastfeeding, controlled with ibuprofen and tylenol.  Denies headache, 

dizziness.  Denies calf pain.  Appetite good and ambulating.  Mood has been 

stable.  Baby's condition has improved and is out of the nursery and on room 

air.  


Functional Status: Reports: Pain Controlled, Tolerating Diet, Ambulating, 

Urinating





- Review of Systems


General: Reports: No Symptoms


HEENT: Reports: No Symptoms


Pulmonary: Reports: No Symptoms


Cardiovascular: Reports: No Symptoms


Gastrointestinal: Reports: No Symptoms


Genitourinary: Reports: No Symptoms


Musculoskeletal: Reports: Back Pain (upper and lower back discomfort.  Epidural 

site is mildly tender but no bruising.)


Skin: Reports: No Symptoms


Neurological: Reports: No Symptoms


Psychiatric: Reports: No Symptoms





- General Info


Date of Service: 21





- Patient Data


Vital Signs - Most Recent: 


                                Last Vital Signs











Temp  36.7 C   21 02:26


 


Pulse  72   21 02:26


 


Resp  14   21 02:26


 


BP  100/60   21 02:26


 


Pulse Ox  97   21 02:26











Weight - Most Recent: 83.506 kg


Lab Results - Last 24 Hours: 


                         Laboratory Results - last 24 hr











  21 Range/Units





  15:15 


 


WBC  13.36 H  (3.98-10.04)  K/mm3


 


RBC  3.78 L  (3.98-5.22)  M/mm3


 


Hgb  11.3  D  (11.2-15.7)  gm/dl


 


Hct  34.7  (34.1-44.9)  %


 


MCV  91.8  (79.4-94.8)  fl


 


MCH  29.9  (25.6-32.2)  pg


 


MCHC  32.6  (32.2-35.5)  g/dl


 


RDW Std Deviation  48.1 H  (36.4-46.3)  fL


 


Plt Count  188  (182-369)  K/mm3


 


MPV  10.4  (9.4-12.3)  fl











Med Orders - Current: 


                               Current Medications





Acetaminophen (Acetaminophen 325 Mg Tab)  650 mg PO Q4H PRN


   PRN Reason: mild pain or fever


   Last Admin: 21 22:47 Dose:  650 mg


   Documented by: 


Benzocaine/Menthol (Benzocaine/Menthol 20%-0.5% Spray 56 Gm Canister)  0 gm TOP 

ASDIRECTED PRN


   PRN Reason: Perineal Comfort Measure


   Last Admin: 21 04:50 Dose:  1 can


   Documented by: 


Docusate Sodium (Docusate Sodium 100 Mg Cap)  100 mg PO BID PRN


   PRN Reason: Constipation


   Last Admin: 21 01:31 Dose:  100 mg


   Documented by: 


Fluoxetine HCl (Fluoxetine 20 Mg Cap)  20 mg PO DAILY FABBY


   Last Admin: 21 09:43 Dose:  20 mg


   Documented by: 


Ibuprofen (Ibuprofen 800 Mg Tab)  800 mg PO Q6H PRN


   PRN Reason: Mild pain or fever


   Last Admin: 21 01:31 Dose:  800 mg


   Documented by: 


Witch Hazel (Witch Hazel Medicated Pads 40/Jar)  1 pad TOP ASDIRECTED PRN


   PRN Reason: Perineal Comfort Measure


   Last Admin: 21 04:51 Dose:  1 tub


   Documented by: 





Discontinued Medications





Calcium Carbonate/Glycine (Calcium Carbonate 500 Mg Tab.Chew)  1,000 mg PO Q2H 

PRN


   PRN Reason: Indigestion


Diphenhydramine HCl (Diphenhydramine 50 Mg/Ml Sdv)  25 mg IVPUSH Q6H PRN


   PRN Reason: pruritis


Ephedrine Sulfate (Ephedrine 50 Mg/Ml Sdv)  5 mg IVPUSH ASDIRECTED PRN


   PRN Reason: Hypotension


   Last Admin: 21 22:50 Dose:  5 mg


   Documented by: 


Fentanyl (Fentanyl 100 Mcg/2 Ml Sdv)  100 mcg EPIDUR Q3H PRN


   PRN Reason: Pain


   Last Admin: 21 20:59 Dose:  100 mcg


   Documented by: 


Fentanyl/Bupivacaine HCl (Bupivacaine/Fentanyl/Ns 100 Ml Bag)  100 ml EPIDUR 

ASDIRECTED PRN


   PRN Reason: Pain


   Last Admin: 21 20:59 Dose:  100 ml


   Documented by: 


Lactated Ringer's (Ringers, Lactated)  1,000 mls @ 100 mls/hr IV ASDIRECTED FABBY


   Last Admin: 21 21:57 Dose:  100 mls/hr


   Documented by: 


Oxytocin/Lactated Ringer's (Pitocin In Lr 10 Units/1,000 Ml)  10 unit in 1,000 

mls @ 500 mls/hr IV .CONTINUOUS FABBY


Oxytocin/Lactated Ringer's (Pitocin In Lr 10 Units/1,000 Ml)  10 unit in 1,000 

mls @ 12 mls/hr IV TITRATE FABBY; Protocol


   Last Titration: 21 03:25 Dose:  83.33 munits/min, 500 mls/hr


   Documented by: 


Lidocaine HCl (Lidocaine 1% 50 Ml Mdv)  20 ml INJECT ONETIME ONE


   Stop: 21 19:39


   Last Admin: 21 07:12 Dose:  Not Given


   Documented by: 


Nalbuphine HCl (Nalbuphine 10 Mg/1 Ml Vial)  10 mg IVPUSH Q2H PRN


   PRN Reason: Pain


Ondansetron HCl (Ondansetron 4 Mg/2 Ml Sdv)  4 mg IVPUSH Q4H PRN


   PRN Reason: Nausea/Vomiting


Sodium Chloride (Sodium Chloride 0.9% 10 Ml Syringe)  10 ml FLUSH ASDIRECTED PRN


   PRN Reason: Keep Vein Open











- Infant Interaction


Infant Disposition, Postpartum:  in Room with Family


Infant Interaction: Holding Infant


Infant Feeding:  Infant; Nursed Well


Other Infant Feeding: IV D10 running due to antibiotics


Support Person: 





- Postpartum Recovery Exam


Fundal Tone: Firm


Fundal Level: 1 Fingerbreadths Below Umbilicus


Fundal Placement: Midline


Lochia Amount: Small


Lochia Color: Rubra/Red


Perineum Description: Intact, Minimal Bruising/Swelling


Episiotomy/Laceration: Approximated


Bladder Status: Voiding


Urinary Elimination: Voided





- Exam


General: Alert, Oriented


HEENT: Pupils Equal, Mucous Membr. Moist/Pink


Neck: Supple


Lungs: Normal Respiratory Effort


Cardiovascular: Regular Rate, Regular Rhythm


GI/Abdominal Exam: Normal Bowel Sounds, Soft, No Distention


Extremities: Normal Inspection, Normal Range of Motion, Pedal Edema


Skin: Warm, Dry, Intact


Wound/Incisions: Healing Well


Neurological: No New Focal Deficit


Psy/Mental Status: Alert, Normal Affect, Normal Mood





- Problem List & Annotations


(1) 39 weeks gestation of pregnancy


SNOMED Code(s): 40149507


   Code(s): Z3A.39 - 39 WEEKS GESTATION OF PREGNANCY   Status: Acute   Current 

Visit: Yes   





(2) Depression affecting pregnancy


SNOMED Code(s): 89518431773101


   Code(s): O99.340 - OT MENTAL DISORDERS COMPLICATING PREGNANCY, UNSP 

TRIMESTER; F32.9 - MAJOR DEPRESSIVE DISORDER, SINGLE EPISODE, UNSPECIFIED   

Status: Acute   Current Visit: No   





(3) Mother currently breast-feeding


SNOMED Code(s): 159593153


   Code(s): Z39.1 - ENCOUNTER FOR CARE AND EXAMINATION OF LACTATING MOTHER   

Status: Acute   Current Visit: No   





(4) Normal spontaneous vaginal delivery


SNOMED Code(s): 20739857, 747659616


   Code(s): O80 - ENCOUNTER FOR FULL-TERM UNCOMPLICATED DELIVERY   Status: Acute

   Current Visit: No   





- Problem List Review


Problem List Initiated/Reviewed/Updated: Yes





- My Orders


Last 24 Hours: 


My Active Orders





21 09:00


FLUoxetine [PROzac]   20 mg PO DAILY 





21 04:44


Heat Therapy [OM.PC] PRN 














- Assessment


Assessment:: 





Postpartum Day #2


Doing well, breastfeeding.  Pain controlled with ibuprofen and tylenol


Mood stable on fluoxetine 20 mg daily





- Plan


Plan:: 





Pregnancy at 39+3 weeks gestation with spontaneous onset of labor.  Contractions

 mild but every 3 minutes, 3cm dilated, 80% effaced with intact membranes.  

Definite change from last week in the clinic.  GBS negative.  Pregnancy 

complicated by depression, well controlled on fluoxetine 20 mg daily.





Plan:  AROM to augment labor and then add pitocin if needed.


        Anticipate vaginal delivery.


        Plans to breastfeed. 


        





Postpartum 0415 on 21


Breastfeeding support and encouragement.


Routine postpartum care.


CBC in am.


Continue fluoxetine 20 mg daily and monitor for PPD.





21 0900 Postpartum day #2


continue breastfeeding support and encouragement.


Routine postpartum care.


Do EPDS.


Plan discharge tomorrow.

## 2021-05-07 NOTE — [id number].[id number]
**Discharge Summary





- Hospital Course


Free Text/Narrative:: 


24 yo G3A1P1 at 39+3 weeks gestation admitted for elective induction of labor 

due to term and hx of large baby (9lb 2oz).  She was having contractions last 

night and took a Unisom and was able to get some sleep. She has been feeling 

contractions since this afternoon and more pelvic pressure.  She is GBS 

negative.  Blood type A positive.  Her 1 hour glucola was wnl.  Her infectious 

disease screenings were all negative including Hep C ab.  Pregnancy has been 

complicated by depression and she has been on fluoxetine throughout the 

pregnancy and dose was increased to her current 20 mg daily dose on 3/12/21.  

Her mood has been stable on this dose with a PHQ-9 score of 2 on 21. She 

plans to breastfeed with this baby and she did breastfeed with her first.  She 

would like a circumcision if they have a boy.  





On admission, NST is reactive, no decels and contractions noted about every 3 

minutes.  Moderate variability and good accelerations noted.  COVID test was 

done on admission and was negative.   On exam, cervix is anterior, 3cm and soft 

and 80% effaced.  Vertex presentation, station 0 and intact membranes.  

Discussed with patient and will do AROM to augment her early labor.  AROM 

performed at 1945 and clear fluid drained. Epidural placed and dosed at 2130 and

she was comfortable.  Pitocin IV was then started to augment labor and was 

increased to a max of 8 mu/min.  She was complete by 0200 and I was called to 

come in.  We started pushing at 0221 and she did very well with pushing.  Baby 

tolerated second stage of labor.  The perineum was tight, so I did perform RML 

episiotomy and baby delivered with next set of pushes.  Baby delivered from ROZ 

presentation .  There was nuchal cord x 1 that easily slipped over baby's head 

and then the shoulders delivered without difficulty.  It was a baby boy.  Time 

of delivery was 0253. Pitocin IV bolus was started. He was dried and stimulated 

and bulb suction use to suction mouth and nose and then baby was placed on 

mother's abdomen.  He was not crying, so the cord was clamped and cut and baby 

brought over to the Panda warmer.  PPV was performed with bag and mask.  HR was 

>100.  Initially not getting a good seal so baby repositioned and a smaller mask

was used to provide PPV and then we started noticing spontaneous respirations at

about 5 minutes.  oxygen saturation was 74%, so blow by oxygen was then provided

with 100% oxygen and sats increased to 94-96%.  He still did not have good tone,

temperature probe placed and baby was left on the warmer to get temp up to 

normal.  Bedside glucose was 81.  He was not having any nasal flaring or 

retractions, no grunting.  Lungs clear to auscultation and equal bilaterally and

heart sounds wnl, no murmur noted.  Apgars were 2, 6 and 8 at 1, 5 and 10 

minutes respectively.  Birth weight was 4010 grams (8 lb 13.4oz).





I then went back to [facility] and placenta was sitting in vagina and delivered at 

0305.  There was no extension to the episiotomy and it was repaired in the usual

manner with 3-0 vicryl.  EBL was 100 ml.  Bimanual exam done after repair was 

complete and a few clots were expressed.  Uterus was firm and contracted.  Baby 

was brought over to Mom and placed skin to skin on her chest and then he vomited

a moderate amount of clear mucus and it was coming out his nose as well.  Bulb 

suctioning performed and then brought back to the warmer and Delee suction 

performed, but no further fluid obtained.  Baby then brought back over the Mom. 

He was starting to have some grunting, but no nasal flaring and tone improving. 

RR was 41 and .  Mom and baby were left in delivery room in stable 

condition and close monitoring of baby.  She plans to breastfeed.  Baby had not 

latched by 0410.





Baby did end up being Level 2 for a time and required some oxygen 

supplementation and was treated with IV antibiotics for the 48 hours.  She has 

been able to breastfeed and that has been going well.  Denies nipple pain or 

bruising.  Bleeding has been slowing down, just a few small clots. Has some mild

discomfort at the epidural site.  Denies headache or dizziness.  Has been eating

and ambulating well.  





Diagnosis: Stroke: No


Modified [place] Scale: No Symptoms at All


Modified [place] Scale Score: 0





- Discharge Data


Discharge Date: 21


Discharge Disposition: Home, Self-Care 01


Condition: Good





- Referral to Home Health


Primary Care Physician: 


Gilma Liu MD








- Discharge Diagnosis/Problem(s)


(1) 39 weeks gestation of pregnancy


SNOMED Code(s): 10327418


   ICD Code: Z3A.39 - 39 WEEKS GESTATION OF PREGNANCY   Status: Acute   





(2) Depression affecting pregnancy


SNOMED Code(s): 75090876830447


   ICD Code: O99.340 - OTH MENTAL DISORDERS COMPLICATING PREGNANCY, UNSP 

TRIMESTER; F32.9 - MAJOR DEPRESSIVE DISORDER, SINGLE EPISODE, UNSPECIFIED   

Status: Acute   





(3) Mother currently breast-feeding


SNOMED Code(s): 096986677


   ICD Code: Z39.1 - ENCOUNTER FOR CARE AND EXAMINATION OF LACTATING MOTHER   

Status: Acute   





(4) Normal spontaneous vaginal delivery


SNOMED Code(s): 38663241, 968806426


   ICD Code: O80 - ENCOUNTER FOR FULL-TERM UNCOMPLICATED DELIVERY   Status: 

Acute   





- Patient Instructions


Diet: Regular Diet as Tolerated, Drink 8-10+ Glasses/Day


Activity: As Tolerated


Driving: May Drive Today


Showering/Bathing: May Shower


Notify Provider of: Fever, Increased Pain, Swelling and Redness, Drainage, 

Nausea and/or Vomiting





- Discharge Plan


*PRESCRIPTION DRUG MONITORING PROGRAM REVIEWED*: No


*COPY OF PRESCRIPTION DRUG MONITORING REPORT IN PATIENT NATE: No


Home Medications: 


                                    Home Meds





FLUoxetine [PROzac] 20 mg PO DAILY 19 [History]


Calcium Carbonate [Calcium] 500 mg PO DAILY 21 [History]


Cholecalciferol (Vitamin D3) [Vitamin D] 1 tab PO DAILY 21 [History]


Prenatal Vits #93/Iron Fum/FA [Prenatal Formula Tablet] 1 tab PO DAILY 21 

[History]


Acetaminophen [Tylenol] 650 mg PO Q4H PRN  tablet 21 [Rx]


Benzocaine/Menthol [Dermoplast Pain Relief Spray] 1 applic TOP ASDIRECTED PRN  

canister 21 [Rx]


Docusate Sodium [Colace] 100 mg PO BID PRN  cap 21 [Rx]


Ibuprofen [Motrin] 600 mg PO Q6H PRN  tablet 21 [Rx]


witch hazeL [Tucks] 1 pad TOP ASDIRECTED PRN  pad 21 [Rx]








Oxygen Therapy Mode: Room Air


Patient Handouts:  Breastfeeding, Breastfeeding and Inducing Lactation, 

Exclusive Breastfeeding, Breastfeeding and Medicine Use, Breastfeeding and 

Mastitis, Breastfeeding and Self-Care, Breastfeeding Tips for a Good Latch, 

Breastfeeding and Cracked or Sore Nipples, Eating Plan for Breastfeeding Women


Referrals: 


Gilma Liu MD [Primary Care Provider] - 





- Discharge Summary/Plan Comment


DC Time >30 min.: No


Discharge Summary/Plan Comment: 


25 year old G2 now P2 female who delivered at 39+4 weeks gestation by . She 

was actually schedule for induction but was in early labor when she came in and 

we did AROM to augment and then added some pitocin as well. She progressed 

quickly and pushed for about 30 minutes.  I did do a small right Mediolateral 

episiotomy due to the perineum being really tight and then baby delivered.  1 

loop of cord was noted around baby's neck that easily slipped over the head.  

 ml.  Uterus firmed down nicely with pitocin bolus.  She had a baby boy, 

time of delivery was 0253 and she had a baby boy, weight 8 lb 13.4 oz.  He 

required resuscitation with PPV and then blow by oxygen and then subsequently 

required oxygen supplementation for probably TTN but also treated with IV 

antibiotics due to the repiratory problem. 





Rosario has done well postpartum, has been breastfeeding exclusively.  Her mood 

has been stable with the fluoxetine and she has been bonding well with her baby.

 Bleeding is slowing and pain controlled with ibuprofen and tylenol.  





Assessment and plan:


Normal postpartum course - continue routine postpartum care. Follow up in clinic

for 6 week postpartum visit.


Breastfeeding - continue to nurse on demand, ensure good latch


Depression - doing well on fluoxetine 20 mg daily.  Will follow with her as she 

brings her baby in for checks and then at her 6 week postpartum visit.  





- General Info


Date of Service: 21


Admission Dx/Problem (Free Text: 


                   Patient Status Order with Admit Dx/Problem





21 19:39


Patient Status [ADT] Routine 








                           Admission Diagnosis/Problem











Admission Diagnosis/Problem    Pregnancy














Subjective Update: 





Patient is doing well.  Breastfeeding is going well, denies nipple pain or 

cracking.  She feels the left breast is lynne than the right since baby has 

been favoring the left breast.  Bleeding has been moderate.  Cramping noted when

 breastfeeding, controlled with ibuprofen and tylenol.  Denies headache, 

dizziness.  Denies calf pain.  Appetite good and ambulating.  Mood has been 

stable.  Baby's condition has improved and is out of the nursery and on room 

air.  


Functional Status: Reports: Pain Controlled, Tolerating Diet, Ambulating, 

Urinating





- Review of Systems


General: Reports: No Symptoms


HEENT: Reports: No Symptoms


Pulmonary: Reports: No Symptoms


Cardiovascular: Reports: No Symptoms


Gastrointestinal: Reports: No Symptoms


Genitourinary: Reports: No Symptoms


Musculoskeletal: Reports: Back Pain


Skin: Reports: No Symptoms


Neurological: Reports: No Symptoms


Psychiatric: Reports: No Symptoms





- Patient Data


Vitals - Most Recent: 


                                Last Vital Signs











Temp  36.7 C   21 10:38


 


Pulse  88   21 10:38


 


Resp  16   21 10:38


 


BP  118/74   21 10:38


 


Pulse Ox  98   21 10:38











Weight - Most Recent: 83.506 kg


I&O - Last 24 hours: 


                                 Intake & Output











 21





 06:59 14:59 22:59


 


Intake Total  360 


 


Balance  360 











Med Orders - Current: 


                               Current Medications








Discontinued Medications





Acetaminophen (Acetaminophen 325 Mg Tab)  650 mg PO Q4H PRN


   PRN Reason: mild pain or fever


   Last Admin: 21 20:28 Dose:  650 mg


   Documented by: 


Benzocaine/Menthol (Benzocaine/Menthol 20%-0.5% Spray 56 Gm Canister)  0 gm TOP 

ASDIRECTED PRN


   PRN Reason: Perineal Comfort Measure


   Last Admin: 21 04:50 Dose:  1 can


   Documented by: 


Bupivacaine HCl (Bupivacaine 0.25% 10 Ml Sdv)  10 ml .ROUTE .STK-MED ONE


   Stop: 21 14:01


Calcium Carbonate/Glycine (Calcium Carbonate 500 Mg Tab.Chew)  1,000 mg PO Q2H 

PRN


   PRN Reason: Indigestion


Diphenhydramine HCl (Diphenhydramine 50 Mg/Ml Sdv)  25 mg IVPUSH Q6H PRN


   PRN Reason: pruritis


Docusate Sodium (Docusate Sodium 100 Mg Cap)  100 mg PO BID PRN


   PRN Reason: Constipation


   Last Admin: 21 20:27 Dose:  100 mg


   Documented by: 


Ephedrine Sulfate (Ephedrine 50 Mg/Ml Sdv)  5 mg IVPUSH ASDIRECTED PRN


   PRN Reason: Hypotension


   Last Admin: 21 22:50 Dose:  5 mg


   Documented by: 


Ephedrine Sulfate (Ephedrine 50 Mg/Ml Sdv)  50 mg .ROUTE .STK-MED ONE


   Stop: 21 14:01


Fentanyl (Fentanyl 100 Mcg/2 Ml Sdv)  100 mcg EPIDUR Q3H PRN


   PRN Reason: Pain


   Last Admin: 21 20:59 Dose:  100 mcg


   Documented by: 


Fentanyl/Bupivacaine HCl (Bupivacaine/Fentanyl/Ns 100 Ml Bag)  100 ml EPIDUR 

ASDIRECTED PRN


   PRN Reason: Pain


   Last Admin: 21 20:59 Dose:  100 ml


   Documented by: 


Fluoxetine HCl (Fluoxetine 20 Mg Cap)  20 mg PO DAILY FABBY


   Last Admin: 21 10:53 Dose:  Not Given


   Documented by: 


Lactated Ringer's (Ringers, Lactated)  1,000 mls @ 100 mls/hr IV ASDIRECTED FABBY


   Last Admin: 21 21:57 Dose:  100 mls/hr


   Documented by: 


Oxytocin/Lactated Ringer's (Pitocin In Lr 10 Units/1,000 Ml)  10 unit in 1,000 

mls @ 500 mls/hr IV .CONTINUOUS FABBY


Oxytocin/Lactated Ringer's (Pitocin In Lr 10 Units/1,000 Ml)  10 unit in 1,000 

mls @ 12 mls/hr IV TITRATE FABBY; Protocol


   Last Titration: 21 03:25 Dose:  83.33 munits/min, 500 mls/hr


   Documented by: 


Ibuprofen (Ibuprofen 800 Mg Tab)  800 mg PO Q6H PRN


   PRN Reason: Mild pain or fever


   Last Admin: 21 10:34 Dose:  800 mg


   Documented by: 


Lidocaine HCl (Lidocaine 1% 50 Ml Mdv)  20 ml INJECT ONETIME ONE


   Stop: 21 19:39


   Last Admin: 21 07:12 Dose:  Not Given


   Documented by: 


Nalbuphine HCl (Nalbuphine 10 Mg/1 Ml Vial)  10 mg IVPUSH Q2H PRN


   PRN Reason: Pain


Ondansetron HCl (Ondansetron 4 Mg/2 Ml Sdv)  4 mg IVPUSH Q4H PRN


   PRN Reason: Nausea/Vomiting


Sodium Chloride (Sodium Chloride 0.9% 10 Ml Syringe)  10 ml FLUSH ASDIRECTED PRN


   PRN Reason: Keep Vein Open


Witch Hazel (Witch Hazel Medicated Pads 40/Jar)  1 pad TOP ASDIRECTED PRN


   PRN Reason: Perineal Comfort Measure


   Last Admin: 21 04:51 Dose:  1 tub


   Documented by: 











- Exam


General: Reports: Alert, Oriented, Cooperative, No Acute Distress


HEENT: Reports: Pupils Equal, Mucous Membr. Moist/Pink


Neck: Reports: Supple


Lungs: Reports: Normal Respiratory Effort


Cardiovascular: Reports: Regular Rate, Regular Rhythm


GI/Abdominal Exam: Normal Bowel Sounds, Soft


 (Female) Exam: Enlarged Uterus (Fundus 1 finger below U, firm.  Episiotomy is

 well approximated and healing. ), Vaginal Bleeding


Rectal (Female) Exam: Deferred


Back Exam: Reports: Normal Inspection, Full Range of Motion


Extremities: Normal Inspection, Normal Range of Motion, Non-Tender, Pedal Edema


Skin: Reports: Warm, Dry, Intact


Wound/Incisions: Reports: Healing Well


Neurological: Reports: No New Focal Deficit


Psy/Mental Status: Reports: Alert, Normal Affect, Normal Mood